# Patient Record
Sex: FEMALE | Race: WHITE | Employment: OTHER | URBAN - METROPOLITAN AREA
[De-identification: names, ages, dates, MRNs, and addresses within clinical notes are randomized per-mention and may not be internally consistent; named-entity substitution may affect disease eponyms.]

---

## 2017-01-09 RX ORDER — SIMVASTATIN 20 MG
TABLET ORAL
Qty: 30 TABLET | Refills: 0 | Status: SHIPPED | OUTPATIENT
Start: 2017-01-09 | End: 2017-02-09

## 2017-01-10 ENCOUNTER — HOSPITAL ENCOUNTER (OUTPATIENT)
Dept: MAMMOGRAPHY | Facility: HOSPITAL | Age: 67
Discharge: HOME | End: 2017-01-10
Attending: INTERNAL MEDICINE
Payer: MEDICARE

## 2017-01-10 DIAGNOSIS — Z12.31 SCREENING MAMMOGRAM, ENCOUNTER FOR: ICD-10-CM

## 2017-01-10 PROCEDURE — 77067 SCR MAMMO BI INCL CAD: CPT

## 2017-01-11 ENCOUNTER — OFFICE VISIT (OUTPATIENT)
Dept: PHYSICAL THERAPY | Facility: HOSPITAL | Age: 67
End: 2017-01-11
Attending: INTERNAL MEDICINE
Payer: MEDICARE

## 2017-01-11 DIAGNOSIS — T14.8XXA MUSCLE STRAIN: ICD-10-CM

## 2017-01-11 DIAGNOSIS — M54.2 NECK PAIN: ICD-10-CM

## 2017-01-11 DIAGNOSIS — M47.812 OSTEOARTHRITIS OF CERVICAL SPINE, UNSPECIFIED SPINAL OSTEOARTHRITIS COMPLICATION STATUS: Primary | ICD-10-CM

## 2017-01-11 PROCEDURE — 97161 PT EVAL LOW COMPLEX 20 MIN: CPT

## 2017-01-11 PROCEDURE — 97110 THERAPEUTIC EXERCISES: CPT

## 2017-01-11 NOTE — PATIENT INSTRUCTIONS
1. Sit up tall with good posture in a chair. Move your head gently up and down (chin towards chest then to look at ceiling). Do 10 times. A little discomfort but move slowly and only as far as you can without an increase in pain.      2. Sit up tall with go

## 2017-01-11 NOTE — PROGRESS NOTES
PT EVALUATION:   Referring Physician: Kris Lea MD    Date of Onset: 12/21/2016 Date of Service: 1/11/2017   Neck pain.      SUBJECTIVE:   PATIENT SUMMARY:  Vishal Plummer is a 78year old y/o female who presents to therapy today with complaints of Precautions: None      OBJECTIVE:   Observation/Posture: Forward head posture with hyperkyphosis. B shoulders rounded and protracted.      Cervical AROM:  Pain (+/-)   Flexion Reduced ~50% +R side only   Extension Reduced ~25% (-)   R Sidebend Reduced ~75 without limitation. Frequency/Duration: Patient will be seen for 2x/week for 5 weeks or a total of 10 visits over a 90 day period.  Treatment will include: Manual Therapy, Neuromuscular Re-education, Therapeutic Activities, Therapeutic Exercise, Home Ex

## 2017-01-13 ENCOUNTER — OFFICE VISIT (OUTPATIENT)
Dept: PHYSICAL THERAPY | Facility: HOSPITAL | Age: 67
End: 2017-01-13
Attending: INTERNAL MEDICINE
Payer: MEDICARE

## 2017-01-13 PROCEDURE — 97140 MANUAL THERAPY 1/> REGIONS: CPT

## 2017-01-13 PROCEDURE — 97110 THERAPEUTIC EXERCISES: CPT

## 2017-01-13 NOTE — PROGRESS NOTES
Diagnosis: Osteoarthritis of cervical spine, unspecified spinal osteoarthritis complication status (B05.182)  Neck pain (M54.2)  Authorized # of Visits:  2        Insurance:BCBS MEDICARE ADV IHP IPA   Fall Risk: standard         Precautions: n/a

## 2017-01-18 ENCOUNTER — APPOINTMENT (OUTPATIENT)
Dept: PHYSICAL THERAPY | Facility: HOSPITAL | Age: 67
End: 2017-01-18
Attending: INTERNAL MEDICINE
Payer: MEDICARE

## 2017-01-20 ENCOUNTER — OFFICE VISIT (OUTPATIENT)
Dept: PHYSICAL THERAPY | Facility: HOSPITAL | Age: 67
End: 2017-01-20
Attending: INTERNAL MEDICINE
Payer: MEDICARE

## 2017-01-20 PROCEDURE — 97140 MANUAL THERAPY 1/> REGIONS: CPT

## 2017-01-20 PROCEDURE — 97110 THERAPEUTIC EXERCISES: CPT

## 2017-01-20 NOTE — PROGRESS NOTES
Diagnosis: Osteoarthritis of cervical spine, unspecified spinal osteoarthritis complication status (Z11.114)  Neck pain (M54.2)  Authorized # of Visits:  3        Insurance:BCBS MEDICARE ADV IHP IPA   Fall Risk: standard         Precautions: n/a

## 2017-01-25 ENCOUNTER — OFFICE VISIT (OUTPATIENT)
Dept: PHYSICAL THERAPY | Facility: HOSPITAL | Age: 67
End: 2017-01-25
Attending: INTERNAL MEDICINE
Payer: MEDICARE

## 2017-01-25 PROCEDURE — 97110 THERAPEUTIC EXERCISES: CPT

## 2017-01-25 NOTE — PROGRESS NOTES
Diagnosis: Osteoarthritis of cervical spine, unspecified spinal osteoarthritis complication status (I64.288)  Neck pain (M54.2)  Authorized # of Visits: 4        Insurance:BCBS MEDICARE ADV IHP IPA   Fall Risk: standard         Precautions: n/a           M Treatment: 45 min  Total Treatment Time: 49 min

## 2017-01-27 ENCOUNTER — OFFICE VISIT (OUTPATIENT)
Dept: PHYSICAL THERAPY | Facility: HOSPITAL | Age: 67
End: 2017-01-27
Attending: INTERNAL MEDICINE
Payer: MEDICARE

## 2017-01-27 PROCEDURE — 97110 THERAPEUTIC EXERCISES: CPT

## 2017-01-27 NOTE — PROGRESS NOTES
Diagnosis: Osteoarthritis of cervical spine, unspecified spinal osteoarthritis complication status (V86.411)  Neck pain (M54.2)  Authorized # of Visits: 4        Insurance:BCBS MEDICARE ADV IHP IPA   Fall Risk: standard         Precautions: n/a           M

## 2017-01-30 RX ORDER — DONEPEZIL HYDROCHLORIDE 10 MG/1
TABLET, FILM COATED ORAL
Qty: 30 TABLET | Refills: 0 | Status: SHIPPED | OUTPATIENT
Start: 2017-01-30 | End: 2017-02-26

## 2017-02-01 ENCOUNTER — APPOINTMENT (OUTPATIENT)
Dept: PHYSICAL THERAPY | Facility: HOSPITAL | Age: 67
End: 2017-02-01
Attending: INTERNAL MEDICINE
Payer: MEDICARE

## 2017-02-01 PROCEDURE — 97140 MANUAL THERAPY 1/> REGIONS: CPT

## 2017-02-01 PROCEDURE — 97110 THERAPEUTIC EXERCISES: CPT

## 2017-02-01 NOTE — PROGRESS NOTES
Diagnosis: Osteoarthritis of cervical spine, unspecified spinal osteoarthritis complication status (W90.469)  Neck pain (M54.2)  Authorized # of Visits: 6        Insurance:BCBS MEDICARE ADV IHP IPA   Fall Risk: standard         Precautions: n/a           M

## 2017-02-03 ENCOUNTER — OFFICE VISIT (OUTPATIENT)
Dept: PHYSICAL THERAPY | Facility: HOSPITAL | Age: 67
End: 2017-02-03
Attending: INTERNAL MEDICINE
Payer: MEDICARE

## 2017-02-03 PROCEDURE — 97110 THERAPEUTIC EXERCISES: CPT

## 2017-02-03 PROCEDURE — 97140 MANUAL THERAPY 1/> REGIONS: CPT

## 2017-02-03 NOTE — PROGRESS NOTES
Discharge Report    Diagnosis: Osteoarthritis of cervical spine, unspecified spinal osteoarthritis complication status (Q54.215)  Neck pain (M54.2)          Patient Name: Hao Wade, : 1950, MRN: [id number]   Date:  2/3/2017  Radha Kumari Mobility: Walking and Moving Around, CJ: 20-39% impaired, limited, or restricted  DIscharge: Mobility: Walking and Moving Around, CJ: 20-39% impaired, limited, or restricted      Plan: discharged    Patient was advised of these findings, precautions, and t

## 2017-02-09 RX ORDER — SIMVASTATIN 20 MG
TABLET ORAL
Qty: 90 TABLET | Refills: 0 | Status: SHIPPED | OUTPATIENT
Start: 2017-02-09 | End: 2017-05-11

## 2017-02-10 ENCOUNTER — TELEPHONE (OUTPATIENT)
Dept: INTERNAL MEDICINE CLINIC | Facility: CLINIC | Age: 67
End: 2017-02-10

## 2017-02-10 NOTE — TELEPHONE ENCOUNTER
Patient POA calling states you are going to prescribe a medication if patient becomes more aggressive please send medication to pharmacy. Also patient finished PT on Friday how soon is patient to follow up in office.

## 2017-02-13 NOTE — TELEPHONE ENCOUNTER
Left message on voice mail for Mehrdad Sullivan to call office and schedule appt for patient to see Dr. Savana Dozierort the week of 2/20/17 phone number left to schedule appt.

## 2017-02-16 ENCOUNTER — TELEPHONE (OUTPATIENT)
Dept: INTERNAL MEDICINE CLINIC | Facility: CLINIC | Age: 67
End: 2017-02-16

## 2017-02-16 NOTE — TELEPHONE ENCOUNTER
Pt is eligible for a Medicare Annual Health Assessment. Pt's POA was to schedule f/u w/PCP. No appt on schedule. Left msg to call back.

## 2017-02-27 RX ORDER — DONEPEZIL HYDROCHLORIDE 10 MG/1
TABLET, FILM COATED ORAL
Qty: 30 TABLET | Refills: 0 | Status: SHIPPED | OUTPATIENT
Start: 2017-02-27 | End: 2017-03-28

## 2017-02-28 ENCOUNTER — TELEPHONE (OUTPATIENT)
Dept: INTERNAL MEDICINE CLINIC | Facility: CLINIC | Age: 67
End: 2017-02-28

## 2017-02-28 RX ORDER — LEVOTHYROXINE SODIUM 0.05 MG/1
TABLET ORAL
Qty: 90 TABLET | Refills: 0 | Status: SHIPPED | OUTPATIENT
Start: 2017-02-28 | End: 2017-06-30

## 2017-02-28 NOTE — TELEPHONE ENCOUNTER
Called and notified [employer] that letter is ready to be picked up. Per Midwayk[facility] Miller she will come in tomorrow to  lette. r

## 2017-02-28 NOTE — TELEPHONE ENCOUNTER
Patient Sarahstanford Taylor states patient is getting meals on wheels and they require letter stating her medical condition and also they need a list of medication's. Please call [place] Angela she will come to the [facility] office to .

## 2017-03-05 NOTE — MR AVS SNAPSHOT
[facility]  [address] [address]  [phone]               Thank you for choosing us for your health care visit with Gabo Gautam MD.  We are glad to serve you and happy to provide you with this summary of your vi physician's office. At that time, you will be provided with any authorization numbers or be assured that none are required. You can then schedule your appointment.  Failure to obtain required authorization numbers can create reimbursement difficulties for y [place], [address] [id number]     Phone:  [phone]    - simvastatin 20 MG Tabs            Health Goals discussed Today        Last Edited       Therapy Goals 1/11/2017  1:33 PM by Norberto Henry, PT     Goal Comments    1.  Pt will demonstrate given yes

## 2017-03-06 ENCOUNTER — OFFICE VISIT (OUTPATIENT)
Dept: INTERNAL MEDICINE CLINIC | Facility: CLINIC | Age: 67
End: 2017-03-06

## 2017-03-06 VITALS
TEMPERATURE: 98 F | HEIGHT: 61 IN | WEIGHT: 132.81 LBS | BODY MASS INDEX: 25.07 KG/M2 | HEART RATE: 76 BPM | SYSTOLIC BLOOD PRESSURE: 132 MMHG | DIASTOLIC BLOOD PRESSURE: 89 MMHG

## 2017-03-06 DIAGNOSIS — E78.5 HYPERLIPIDEMIA, UNSPECIFIED HYPERLIPIDEMIA TYPE: ICD-10-CM

## 2017-03-06 DIAGNOSIS — M54.2 CHRONIC NECK PAIN: Primary | ICD-10-CM

## 2017-03-06 DIAGNOSIS — F02.80 ALZHEIMER'S DEMENTIA WITHOUT BEHAVIORAL DISTURBANCE, UNSPECIFIED TIMING OF DEMENTIA ONSET (HCC): ICD-10-CM

## 2017-03-06 DIAGNOSIS — E03.9 HYPOTHYROIDISM, UNSPECIFIED TYPE: ICD-10-CM

## 2017-03-06 DIAGNOSIS — G89.29 CHRONIC NECK PAIN: Primary | ICD-10-CM

## 2017-03-06 DIAGNOSIS — M85.80 OSTEOPENIA, UNSPECIFIED LOCATION: ICD-10-CM

## 2017-03-06 DIAGNOSIS — R45.1 AGITATION: ICD-10-CM

## 2017-03-06 DIAGNOSIS — G30.9 ALZHEIMER'S DEMENTIA WITHOUT BEHAVIORAL DISTURBANCE, UNSPECIFIED TIMING OF DEMENTIA ONSET (HCC): ICD-10-CM

## 2017-03-06 LAB
APPEARANCE: CLEAR
BILIRUBIN: NEGATIVE
GLUCOSE (URINE DIPSTICK): NEGATIVE MG/DL
KETONES (URINE DIPSTICK): NEGATIVE MG/DL
LEUKOCYTES: NEGATIVE
MULTISTIX LOT#: NORMAL NUMERIC
NITRITE, URINE: NEGATIVE
PH, URINE: 5.5 (ref 4.5–8)
PROTEIN (URINE DIPSTICK): NEGATIVE MG/DL
SPECIFIC GRAVITY: 1.02 (ref 1–1.03)
URINE-COLOR: YELLOW
UROBILINOGEN,SEMI-QN: 0.2 MG/DL (ref 0–1.9)

## 2017-03-06 PROCEDURE — 99214 OFFICE O/P EST MOD 30 MIN: CPT | Performed by: INTERNAL MEDICINE

## 2017-03-06 PROCEDURE — 81002 URINALYSIS NONAUTO W/O SCOPE: CPT | Performed by: INTERNAL MEDICINE

## 2017-03-06 PROCEDURE — G0463 HOSPITAL OUTPT CLINIC VISIT: HCPCS | Performed by: INTERNAL MEDICINE

## 2017-03-07 ENCOUNTER — TELEPHONE (OUTPATIENT)
Dept: INTERNAL MEDICINE CLINIC | Facility: CLINIC | Age: 67
End: 2017-03-07

## 2017-03-07 NOTE — PROGRESS NOTES
HPI:    Patient ID: Amarjit Jama is a 78year old female. HPI  Pt here with friend.  Pt has been more agitated lately with things like mail and why her friend is checking her mail who is her POA to make sure the bills and everything else is paid and p Smoking Status: Never Smoker                      Smokeless Status: Never Used                        Alcohol Use: No                 PHYSICAL EXAM:    Physical Exam   Constitutional: She is oriented to person, place, and time.  She appears well-developed Referrals:  PHYSIATRY - INTERNAL  NEURO - INTERNAL  MRI SPINE CERVICAL (CPT=72141)        DX#[id number]

## 2017-03-07 NOTE — PATIENT INSTRUCTIONS
ASSESSMENT/PLAN:   Chronic neck pain  (primary encounter diagnosis)-?, will order an MRI and also refer to physiatrist,   Agitation- ?, could be due to advancing dementia follow with neuro  Alzheimer's dementia without behavioral disturbance, unspecif

## 2017-03-15 ENCOUNTER — TELEPHONE (OUTPATIENT)
Dept: NEUROLOGY | Facility: CLINIC | Age: 67
End: 2017-03-15

## 2017-03-15 NOTE — TELEPHONE ENCOUNTER
Shila Edwards has an upcoming appt with Dr Beth Kline on 3-28-17. She was given an order for a cervical MRI from Dr Carey Santamaria and Robert Rader, her POA, is asking if Dr Beth Kline would be ordering a brain MRI for her dementia.  If so, if he can give her an order now so she ca

## 2017-03-17 RX ORDER — MEMANTINE HYDROCHLORIDE 10 MG/1
TABLET ORAL
Qty: 180 TABLET | Refills: 0 | Status: SHIPPED | OUTPATIENT
Start: 2017-03-17 | End: 2017-06-16

## 2017-03-24 ENCOUNTER — MED REC SCAN ONLY (OUTPATIENT)
Dept: NEUROLOGY | Facility: CLINIC | Age: 67
End: 2017-03-24

## 2017-03-27 ENCOUNTER — HOSPITAL ENCOUNTER (OUTPATIENT)
Dept: MRI IMAGING | Facility: HOSPITAL | Age: 67
Discharge: HOME | End: 2017-03-27
Attending: INTERNAL MEDICINE
Payer: MEDICARE

## 2017-03-27 DIAGNOSIS — G89.29 CHRONIC NECK PAIN: ICD-10-CM

## 2017-03-27 DIAGNOSIS — M54.2 CHRONIC NECK PAIN: ICD-10-CM

## 2017-03-27 PROCEDURE — 72141 MRI NECK SPINE W/O DYE: CPT

## 2017-03-28 NOTE — PROGRESS NOTES
Garcia Natarajan : 1950     HPI:   Patient presents with:  Memory Loss: LOV: 12/9/15. F/U on memory loss. Patient presents today with friend Pepemaria m Jonas.  Patient feels her memory is doing okay and feels the aricept 10 mg daily and namenda 10 BID has helpe tablet Rfl: 3   LEVOTHYROXINE SODIUM 50 MCG Oral Tab TAKE 1 TABLET BY MOUTH BEFORE BREAKFAST Disp: 90 tablet Rfl: 0   SIMVASTATIN 20 MG Oral Tab TAKE 1 TABLET BY MOUTH NIGHTLY Disp: 90 tablet Rfl: 0   Cyclobenzaprine HCl 5 MG Oral Tab Take 1 tablet (5 mg t appearance: In no distress  CV: No  Evidence of Carotid Bruits, Regular Rate and Rhythm. Neuro:  Higher Integrative Functions:  Alert and cooperative, with normal attention span and concentration.   Speech and language normal.   She knew her birthday, but

## 2017-05-01 ENCOUNTER — OFFICE VISIT (OUTPATIENT)
Dept: INTERNAL MEDICINE CLINIC | Facility: CLINIC | Age: 67
End: 2017-05-01

## 2017-05-01 VITALS
WEIGHT: 132 LBS | HEART RATE: 69 BPM | BODY MASS INDEX: 24.92 KG/M2 | DIASTOLIC BLOOD PRESSURE: 85 MMHG | TEMPERATURE: 98 F | SYSTOLIC BLOOD PRESSURE: 130 MMHG | HEIGHT: 61 IN

## 2017-05-01 DIAGNOSIS — F03.90 ADVANCED DEMENTIA (HCC): ICD-10-CM

## 2017-05-01 DIAGNOSIS — R39.9 URINARY SYMPTOM OR SIGN: Primary | ICD-10-CM

## 2017-05-01 DIAGNOSIS — F41.9 ANXIETY: ICD-10-CM

## 2017-05-01 PROCEDURE — 81002 URINALYSIS NONAUTO W/O SCOPE: CPT | Performed by: INTERNAL MEDICINE

## 2017-05-01 PROCEDURE — 99213 OFFICE O/P EST LOW 20 MIN: CPT | Performed by: INTERNAL MEDICINE

## 2017-05-01 PROCEDURE — G0463 HOSPITAL OUTPT CLINIC VISIT: HCPCS | Performed by: INTERNAL MEDICINE

## 2017-05-01 RX ORDER — SIMVASTATIN 20 MG
TABLET ORAL
Qty: 90 TABLET | Refills: 1 | Status: ON HOLD | OUTPATIENT
Start: 2017-05-01 | End: 2020-10-20

## 2017-05-02 NOTE — PROGRESS NOTES
HPI:    Patient ID: Maria Antonia Avila is a 78year old female. HPI  Pt comes with friend.  Pt has been more anxious recently since her back sold her mortgage to another bank in [place] so pt  Has been going to the bank every day asking questions about this a • Other[other] [OTHER] Mother      alzheimers      Social History:   Smoking Status: Never Smoker                      Smokeless Status: Never Used                        Alcohol Use: No                 PHYSICAL EXAM:    Physical Exam   Constitutional: S

## 2017-05-11 RX ORDER — SIMVASTATIN 20 MG
TABLET ORAL
Qty: 90 TABLET | Refills: 0 | Status: SHIPPED | OUTPATIENT
Start: 2017-05-11 | End: 2018-02-03

## 2017-06-16 NOTE — TELEPHONE ENCOUNTER
Dr. Sandrita Garces, rx request for Memantine HCL 10 mg, UNABLE to fill per protocol. Please Review. Thank you.     Refill Protocol Appointment Criteria  · Appointment scheduled in the past 6 months or in the next 3 months  Recent Visits       Provider Departme

## 2017-06-17 RX ORDER — MEMANTINE HYDROCHLORIDE 10 MG/1
TABLET ORAL
Qty: 180 TABLET | Refills: 0 | Status: ON HOLD | OUTPATIENT
Start: 2017-06-17 | End: 2020-10-20

## 2017-06-18 ENCOUNTER — HOSPITAL ENCOUNTER (EMERGENCY)
Facility: HOSPITAL | Age: 67
Discharge: HOME | End: 2017-06-18
Attending: EMERGENCY MEDICINE
Payer: MEDICARE

## 2017-06-18 VITALS
HEIGHT: 60 IN | BODY MASS INDEX: 29.45 KG/M2 | DIASTOLIC BLOOD PRESSURE: 88 MMHG | OXYGEN SATURATION: 99 % | WEIGHT: 150 LBS | TEMPERATURE: 98 F | SYSTOLIC BLOOD PRESSURE: 154 MMHG | RESPIRATION RATE: 18 BRPM | HEART RATE: 72 BPM

## 2017-06-18 DIAGNOSIS — S11.91XA LACERATION OF NECK, INITIAL ENCOUNTER: ICD-10-CM

## 2017-06-18 DIAGNOSIS — S01.81XA FACIAL LACERATION, INITIAL ENCOUNTER: Primary | ICD-10-CM

## 2017-06-18 PROCEDURE — 90471 IMMUNIZATION ADMIN: CPT

## 2017-06-18 PROCEDURE — 99283 EMERGENCY DEPT VISIT LOW MDM: CPT

## 2017-06-18 RX ORDER — CEPHALEXIN 500 MG/1
500 CAPSULE ORAL 2 TIMES DAILY
Qty: 10 CAPSULE | Refills: 0 | Status: SHIPPED | OUTPATIENT
Start: 2017-06-18 | End: 2017-06-23

## 2017-06-19 NOTE — ED PROVIDER NOTES
Patient presents with:  Laceration Abrasion (integumentary)    Stated Complaint:     HPI  Patient complains being scratched by her cat tonight. Just below the left eye and on the right side of the neck. Describes as superficial minimal bleeding.   No eye visit.     Follow-up:  Pam Phillip MD  [facility] [address] [address] [phone]            Medications Prescribed:  Current Discharge Medication List    START taking these medications    cephALEXin 500 MG Oral Cap  Take 1 capsule (500 mg tot

## 2017-06-23 ENCOUNTER — TELEPHONE (OUTPATIENT)
Dept: INTERNAL MEDICINE CLINIC | Facility: CLINIC | Age: 67
End: 2017-06-23

## 2017-06-23 NOTE — TELEPHONE ENCOUNTER
Pt is eligible for a Medicare Annual Health Assessment anytime during the calendar year. Left message to call back [phone].

## 2017-06-30 RX ORDER — LEVOTHYROXINE SODIUM 0.05 MG/1
TABLET ORAL
Qty: 90 TABLET | Refills: 0 | Status: SHIPPED | OUTPATIENT
Start: 2017-06-30 | End: 2017-09-26

## 2017-08-15 ENCOUNTER — TELEPHONE (OUTPATIENT)
Dept: INTERNAL MEDICINE CLINIC | Facility: CLINIC | Age: 67
End: 2017-08-15

## 2017-09-26 RX ORDER — LEVOTHYROXINE SODIUM 0.05 MG/1
TABLET ORAL
Qty: 90 TABLET | Refills: 0 | Status: SHIPPED | OUTPATIENT
Start: 2017-09-26 | End: 2017-12-24

## 2017-10-16 ENCOUNTER — IMMUNIZATION (OUTPATIENT)
Dept: INTERNAL MEDICINE CLINIC | Facility: CLINIC | Age: 67
End: 2017-10-16

## 2017-10-16 PROCEDURE — 90653 IIV ADJUVANT VACCINE IM: CPT | Performed by: INTERNAL MEDICINE

## 2017-10-16 PROCEDURE — G0008 ADMIN INFLUENZA VIRUS VAC: HCPCS | Performed by: INTERNAL MEDICINE

## 2017-11-29 ENCOUNTER — TELEPHONE (OUTPATIENT)
Dept: INTERNAL MEDICINE CLINIC | Facility: CLINIC | Age: 67
End: 2017-11-29

## 2017-11-29 DIAGNOSIS — L60.9 NAIL PROBLEM: Primary | ICD-10-CM

## 2017-11-29 NOTE — TELEPHONE ENCOUNTER
Patients daughter Theodora Torres called, asking if you would refer patient to a podiatrist or if you would be handeling her foot care

## 2017-11-29 NOTE — TELEPHONE ENCOUNTER
Per Juan A Ojeda, need podiatry referral for long toe nails. Referral pending. Please add diagnosis.

## 2017-12-24 NOTE — TELEPHONE ENCOUNTER
Please advise regarding pended refill request as unable to refill per protocol; no TSH in over a year.     Hypothyroid Medications  Protocol Criteria:  Appointment scheduled in the past 12 months or the next 3 months  TSH resulted in the past 12 months that

## 2017-12-25 RX ORDER — LEVOTHYROXINE SODIUM 0.05 MG/1
TABLET ORAL
Qty: 90 TABLET | Refills: 0 | Status: SHIPPED | OUTPATIENT
Start: 2017-12-25 | End: 2018-03-26

## 2018-01-08 ENCOUNTER — TELEPHONE (OUTPATIENT)
Dept: INTERNAL MEDICINE CLINIC | Facility: CLINIC | Age: 68
End: 2018-01-08

## 2018-01-08 DIAGNOSIS — Z12.31 BREAST CANCER SCREENING BY MAMMOGRAM: Primary | ICD-10-CM

## 2018-01-08 NOTE — TELEPHONE ENCOUNTER
Requesting a Mammo order please call pt friend Wilman Lee at work from 8:30 to 4:30 Work [phone]   After 5 at her cell

## 2018-01-09 ENCOUNTER — OFFICE VISIT (OUTPATIENT)
Dept: PODIATRY CLINIC | Facility: CLINIC | Age: 68
End: 2018-01-09

## 2018-01-09 DIAGNOSIS — M79.674 PAIN IN TOES OF BOTH FEET: Primary | ICD-10-CM

## 2018-01-09 DIAGNOSIS — B35.1 ONYCHOMYCOSIS: ICD-10-CM

## 2018-01-09 DIAGNOSIS — M79.675 PAIN IN TOES OF BOTH FEET: Primary | ICD-10-CM

## 2018-01-09 PROCEDURE — 99202 OFFICE O/P NEW SF 15 MIN: CPT | Performed by: PODIATRIST

## 2018-01-09 PROCEDURE — 11721 DEBRIDE NAIL 6 OR MORE: CPT | Performed by: PODIATRIST

## 2018-01-09 NOTE — PROGRESS NOTES
HPI:    Patient ID: Hao Wade is a 75year old female. HPI  This pleasant 79-year-old female presents as a new patient to me on referral from [address] [place] [address]. Patient is accompanied by a friend who did most of her talking.   Patient's chief complaint is toenails was accomplished today without incident. I reduced nail, subungual debris, and some keratosis. No ulcerations or infections were encountered.   I cautioned this patient in reference to daily inspection, proper hygiene, and the consistent daily us

## 2018-02-03 ENCOUNTER — HOSPITAL ENCOUNTER (OUTPATIENT)
Dept: MAMMOGRAPHY | Facility: HOSPITAL | Age: 68
Discharge: HOME | End: 2018-02-03
Attending: INTERNAL MEDICINE
Payer: MEDICARE

## 2018-02-03 DIAGNOSIS — Z12.31 BREAST CANCER SCREENING BY MAMMOGRAM: ICD-10-CM

## 2018-02-03 PROCEDURE — 77067 SCR MAMMO BI INCL CAD: CPT | Performed by: INTERNAL MEDICINE

## 2018-02-04 NOTE — TELEPHONE ENCOUNTER
Please advise regarding pended refill request as unable to refill per protocol since labs are out of date range.     Cholesterol Medications  Protocol Criteria:  · Appointment scheduled in the past 12 months or in the next 3 months  · ALT & LDL on file in t

## 2018-02-05 RX ORDER — SIMVASTATIN 20 MG
TABLET ORAL
Qty: 90 TABLET | Refills: 0 | Status: SHIPPED | OUTPATIENT
Start: 2018-02-05 | End: 2018-04-02

## 2018-03-08 ENCOUNTER — PATIENT OUTREACH (OUTPATIENT)
Dept: INTERNAL MEDICINE CLINIC | Facility: CLINIC | Age: 68
End: 2018-03-08

## 2018-03-08 NOTE — PROGRESS NOTES
Patient is eligible for a 2018 Annual Medicare Health Assessment. Discussed in detail w/caregiver, Liu Conrad. Appt scheduled for 4/17/18.

## 2018-03-23 ENCOUNTER — TELEPHONE (OUTPATIENT)
Dept: INTERNAL MEDICINE CLINIC | Facility: CLINIC | Age: 68
End: 2018-03-23

## 2018-03-23 RX ORDER — DONEPEZIL HYDROCHLORIDE 10 MG/1
TABLET, FILM COATED ORAL
Qty: 90 TABLET | Refills: 0 | OUTPATIENT
Start: 2018-03-23

## 2018-03-23 NOTE — TELEPHONE ENCOUNTER
She can see any of the other physicians in his office.  This should be the standard response to this question which may come up a lot

## 2018-03-25 RX ORDER — LEVOTHYROXINE SODIUM 0.05 MG/1
TABLET ORAL
Qty: 90 TABLET | Refills: 0 | Status: CANCELLED | OUTPATIENT
Start: 2018-03-25

## 2018-03-25 NOTE — TELEPHONE ENCOUNTER
Please advise regarding pended refill request as unable to refill per protocol d/t no recent TSH.     Hypothyroid Medications  Protocol Criteria:  Appointment scheduled in the past 12 months or the next 3 months  TSH resulted in the past 12 months that is normal  Recent Outpatient Visits            2 months ago Pain in toes of both feet    [facility], [address] [place], [place]    Office Visit    10 months ago Urinary symptom or sign    [facility], [facility], [address] [facility], [place]    Office Visit    12 months ago Alzheimer's dementia without behavioral disturbance, unspecified timing of dementia onset    [facility], [address], [address], [place]    Office Visit    1 year ago Chronic neck pain    [facility], [facility], [address] [place] [address], Vidya Vu MD    Office Visit    1 year ago     [facility] [address]    Office Visit        Future Appointments       Provider Department Appt Notes    In 2 weeks Mil Torres, [address] [place] [address], [address]  3 mo Nail care    In 3 weeks Rosie Mosley MD [address] [facility]           Lab Results  Component Value Date   TSH 4.39 10/12/2016   [facility] 3.46 04/04/2016

## 2018-03-26 ENCOUNTER — TELEPHONE (OUTPATIENT)
Dept: INTERNAL MEDICINE CLINIC | Facility: CLINIC | Age: 68
End: 2018-03-26

## 2018-03-26 RX ORDER — LEVOTHYROXINE SODIUM 0.05 MG/1
TABLET ORAL
Qty: 30 TABLET | Refills: 0 | Status: SHIPPED | OUTPATIENT
Start: 2018-03-26 | End: 2018-04-25

## 2018-03-26 NOTE — TELEPHONE ENCOUNTER
Patient needs labs last TSH 10/12/16 has appt 4/17/18  Refill only quantity of 30 tablets til patient has labs drawn.

## 2018-04-02 PROBLEM — E53.8 LOW SERUM VITAMIN B12: Status: ACTIVE | Noted: 2018-04-02

## 2018-04-03 NOTE — PROGRESS NOTES
HPI:    Patient ID: Zach Krishna is a 75year old female. HPI  Patient comes in today for follow-up also present before his friends let us and Rik Dickson good friends of patient who take care of her.   They have noticed the patient lately has been coughing Problem Relation Age of Onset   • Cancer Father      bone   • Other [OTHER] Mother      alzheimers   • No Known Problems Self    • No Known Problems Sister    • No Known Problems Daughter    • No Known Problems Maternal Grandmother    • No Known Problems will check  Acute bronchitis, unspecified organism will treat with antibiotic and cough medicine let us know if not better      Orders Placed This Encounter      CBC W Differential W Platelet [E] Specimen      Comp Metabolic Panel (14) [E] Specimen      Yodit Perez

## 2018-04-03 NOTE — PATIENT INSTRUCTIONS
ASSESSMENT/PLAN:   Alzheimer's dementia without behavioral disturbance, unspecified timing of dementia onset  (primary encounter diagnosis)'s probably progressing will refer to neurology Dr. Rony Cazares is retiring so we will give referral to Dr. Gia Kaminski

## 2018-04-09 ENCOUNTER — OFFICE VISIT (OUTPATIENT)
Dept: PODIATRY CLINIC | Facility: CLINIC | Age: 68
End: 2018-04-09

## 2018-04-09 DIAGNOSIS — M79.675 PAIN IN TOES OF BOTH FEET: Primary | ICD-10-CM

## 2018-04-09 DIAGNOSIS — M79.674 PAIN IN TOES OF BOTH FEET: Primary | ICD-10-CM

## 2018-04-09 DIAGNOSIS — B35.1 ONYCHOMYCOSIS: ICD-10-CM

## 2018-04-09 PROCEDURE — 11721 DEBRIDE NAIL 6 OR MORE: CPT | Performed by: PODIATRIST

## 2018-04-09 NOTE — PROGRESS NOTES
HPI:    Patient ID: Leobardo Rodriguez is a 75year old female. HPI  70-year-old female presents with recurrent frustration associated with her nails. She has had relief by previous care.   Review of Systems  I reviewed medical status, medications were not

## 2018-04-25 RX ORDER — LEVOTHYROXINE SODIUM 0.05 MG/1
TABLET ORAL
Qty: 30 TABLET | Refills: 2 | Status: ON HOLD | OUTPATIENT
Start: 2018-04-25 | End: 2020-10-20

## 2018-04-25 NOTE — TELEPHONE ENCOUNTER
Rx request for Levothyroxine Sodium 50 mcg, PASSED Hypothyroid protocol. Rx filled per protocol.     Hypothyroid Medications  Protocol Criteria:  Appointment scheduled in the past 12 months or the next 3 months  TSH resulted in the past 12 months that is no

## 2018-05-02 NOTE — PATIENT INSTRUCTIONS
Caregiver Resources:    [facility] ([facility] [place]):     of Senior Services: Samira Geronimo   Phone:   [phone] or  [phone]  Fax:   [phone]  TDD:   [phone]  Office Hours:   8 a.m. - 4:30 p.m.   Monday - they may ask the same question over and over. Although it can be frustrating, do your best to remain calm. Try changing the subject or getting your loved one to focus on a new task. Or, try to understand why the question is being asked.  For example, your l suspicious, angry, or afraid. They can also become agitated, or see things that aren’t there. If this happens, try to be understanding. For them, the world can be a very stressful place.  However, if these changes are sudden, severe, or create safety issues they see your loved one out alone. · Go with your loved one if he or she insists on leaving the house. Don’t argue or yell. Instead, use distraction or gentle hints to get him or her to return home.   · People with dementia often have a reversed sleep-wake locks you out. Prevent fraud  People with dementia may be easy prey for dishonest salespeople or money scams. Try placing a “No Solicitations” sign on your loved one’s front door.  Add his or her phone number to the [place] [employer]’s “Do Not Call” can make decisions in your loved one’s best interest.  Advance directives and living conn  These documents spell out the kinds of medical treatment your loved one wants — or doesn’t want — in the future. Keep them with your loved one’s medical records.   L it’s normal to want to do as much as you can to help. But you can’t take good care of someone else if you don’t take care of yourself, too. So be sure to take breaks when you need them. It’s not selfish. It’s essential. Get out to see friends. Eat right.  A resentful. This isn’t a sign you’re doing something wrong. It’s completely normal. So accept these emotions as they come. However, if you find yourself feeling hopeless, tired, sad, or guilty most of the time, talk to your healthcare provider.  These feelin This will affect how the rest of your loved one’s body functions.  With end-stage dementia, your loved one may no longer:  · Recognize family members and friends  · Reason or have sound judgment  · Speak or understand language  · Have bowel and bladder cont difficult, remember that the goal is to provide the best care and quality of life for your loved one. Think about conversations you may have shared about the kind of treatments your loved one wants at the end of life.  Consider their personal values or fait support group for families and caregivers of loved ones with dementia may also help. You can seek support from your loved one’s healthcare team as well. You can also contact your [facility] to find other resources within your community.    Alpesh

## 2018-05-04 ENCOUNTER — TELEPHONE (OUTPATIENT)
Dept: INTERNAL MEDICINE CLINIC | Facility: CLINIC | Age: 68
End: 2018-05-04

## 2018-05-04 PROBLEM — F03.90 DEMENTIA WITHOUT BEHAVIORAL DISTURBANCE, UNSPECIFIED DEMENTIA TYPE (HCC): Status: ACTIVE | Noted: 2018-05-04

## 2018-05-04 PROBLEM — F03.90 DEMENTIA WITHOUT BEHAVIORAL DISTURBANCE, UNSPECIFIED DEMENTIA TYPE: Status: ACTIVE | Noted: 2018-05-04

## 2018-05-04 PROBLEM — F03.90 DEMENTIA WITHOUT BEHAVIORAL DISTURBANCE (HCC): Status: ACTIVE | Noted: 2018-05-04

## 2018-05-04 RX ORDER — SIMVASTATIN 20 MG
TABLET ORAL
Qty: 90 TABLET | Refills: 0 | Status: SHIPPED | OUTPATIENT
Start: 2018-05-04 | End: 2018-06-05

## 2018-05-04 NOTE — TELEPHONE ENCOUNTER
Patients power of  Sherrill Holstein called [phone], she is looking into nursing home placement for patient due to the dementia she has. Sherrill Holstein will be coming into the  office to  the patients last office visit with you.   Sherrill Holstein is asking fo

## 2018-05-04 NOTE — TELEPHONE ENCOUNTER
Pt's POA would like to speak to Dr BURCH regarding recommendation for a stay home for [facility].

## 2018-05-04 NOTE — TELEPHONE ENCOUNTER
Rx request for Simvastatin 20 mg, PASSED Cholesterol Protocol. Rx filled per protocol.      Cholesterol Medications  Protocol Criteria:  · Appointment scheduled in the past 12 months or in the next 3 months  · ALT & LDL on file in the past 12 months  · ALT

## 2018-05-04 NOTE — TELEPHONE ENCOUNTER
o spoke to Ronni Jaffe, pt is a danger to self she wonders off walking around on the neighborhood and streets, will take her to ER

## 2018-05-04 NOTE — TELEPHONE ENCOUNTER
Dr. Mily Fisher please see message below. pts POA requesting to speak directly with you. States pt is becoming more easily agitated and is wondering more often. [phone].

## 2018-05-05 PROBLEM — R29.6 FREQUENT FALLS: Status: ACTIVE | Noted: 2018-05-05

## 2018-05-05 PROBLEM — R26.2 DIFFICULTY WALKING: Status: ACTIVE | Noted: 2018-05-05

## 2018-05-05 NOTE — PLAN OF CARE
Problem: Patient Centered Care  Goal: Patient preferences are identified and integrated in the patient's plan of care  Interventions:  - What would you like us to know as we care for you?  My friends help take care of me   - Provide timely, complete, and ac Outcome: Not Progressing  Pt very confused;  Unable to understand teaching or POC    Problem: SAFETY ADULT - FALL  Goal: Free from fall injury  INTERVENTIONS:  - Assess pt frequently for physical needs  - Identify cognitive and physical deficits and behav

## 2018-05-05 NOTE — CM/SW NOTE
Met with friend who states is POA and requesting NH listing, provided with Preferred Provider listing, MD notified of above.

## 2018-05-05 NOTE — ED PROVIDER NOTES
Patient Seen in: [facility] AND [facility] Emergency Department     History   Patient presents with:  Altered Mental Status (neurologic)    Stated Complaint: ams    HPI    75year old female with history of dementia who lives at home alone, brought to ER by friend • No Known Problems Paternal Cousin Male    • No Known Problems Other    • Breast Cancer Neg    • Ovarian Cancer Neg    • DCIS Neg    • LCIS Neg    • BRCA gene + Neg    • Ashkenazi Mu-ism Descent Neg        Smoking status: Never Smoker motion. She exhibits no edema. Neurological: She is alert. No cranial nerve deficit. She exhibits normal muscle tone. She displays no seizure activity. Coordination normal. GCS eye subscore is 4. GCS verbal subscore is 4. GCS motor subscore is 6.    Skin: Temp: 98.8 °F (37.1 °C)    SpO2: 97% 97%   Weight: 58.2 kg      *I personally reviewed and interpreted all ED vitals.     MDM     Emergency Differential Diagnosis: Progressive dementia, less likely delirium    Limitations of history:   unable to obtain hi workup and a number of associated acute management issues with the patient, and I explained the need for further follow-up evaluation and treatment. Risk:  Patient is at High risk of significant complication or comorbidity.         Disposition and Plan

## 2018-05-05 NOTE — SPIRITUAL CARE NOTE
SD    Pt. Has Dementia and cannot communicate much at all, other than to keep saying that her 'dad '.      Chp. SD

## 2018-05-05 NOTE — CM/SW NOTE
5/5/18  Discharge Planning   Spoke with friend Claudean Sell via phone [phone]. Pt resides alone, tri level home, POA reports pt was independent with ADL's and    ambulation, recently became more confused and started wandering.   POA in agreement with sarah

## 2018-05-05 NOTE — CONSULTS
[facility]    Report of Consultation    Memory Bear Patient Status:  Inpatient    1950 MRN [id number]   Location [facility] 5SW/SE Attending Yuly Ha MD   Hosp Day # 1 PCP Taras Chávez MD     Date of Admission: station our nurses but she could not process that this is a hospital.  Patient exhibited significant impairment cognitive function impairing her executive function and her ability to care for self.     Patient was not able to process Mini-Mental Status neit tab 25 mg 25 mg Oral Nightly PRN   Levothyroxine Sodium (SYNTHROID) tab 50 mcg 50 mcg Oral Before breakfast   Memantine HCl (NAMENDA) tab 10 mg 10 mg Oral BID   Vitamin B-12 (VITAMIN B12) tab 1,000 mcg 1,000 mcg Oral Daily   Vitamin C (VITAMIN C) tab 500 m sitting next to the nurse and the nurses station. Patient according to the nurse was wondering in the hallway and she responded to some distraction.   Patient otherwise exhibited inability to sustain attention with her eyes moving around and her thought th

## 2018-05-05 NOTE — H&P
[facility]    History and Physical    [facility] Patient Status:  Inpatient    1950 MRN [id number]   Location [facility] 5SW/SE Attending Dustin Crowder MD   Hosp Day # 1 PCP Bruno Vo MD     Date:  2018  Date of Prior to Admission:  SIMVASTATIN 20 MG Oral Tab TAKE 1 TABLET BY MOUTH NIGHTLY   Sertraline HCl 50 MG Oral Tab Take 1 tablet (50 mg total) by mouth daily.    QUEtiapine Fumarate 25 MG Oral Tab 1/2 to 1 tablet as needed for agitation   LEVOTHYROXINE SODIUM 5 05/05/2018    05/05/2018   CO2 24 05/05/2018    (H) 05/05/2018   CA 8.8 05/05/2018   ALB 3.8 04/02/2018   ALKPHO 56 04/02/2018   BILT 0.4 04/02/2018   TP 6.1 04/02/2018   AST 20 04/02/2018   ALT 15 04/02/2018   T4F 0.95 12/21/2015   TSH 4.65 0

## 2018-05-06 NOTE — PLAN OF CARE
Problem: Patient Centered Care  Goal: Patient preferences are identified and integrated in the patient's plan of care  Interventions:  - What would you like us to know as we care for you?  My friends help take care of me   - Provide timely, complete, and ac assessment.  - Educate pt/family on patient safety including physical limitations  - Instruct pt to call for assistance with activity based on assessment  - Modify environment to reduce risk of injury  - Provide assistive devices as appropriate  - Consider

## 2018-05-06 NOTE — PLAN OF CARE
Problem: Patient Centered Care  Goal: Patient preferences are identified and integrated in the patient's plan of care  Interventions:  - What would you like us to know as we care for you?  My friends help take care of me   - Provide timely, complete, and ac from fall injury  INTERVENTIONS:  - Assess pt frequently for physical needs  - Identify cognitive and physical deficits and behaviors that affect risk of falls.   - [place] fall precautions as indicated by assessment.  - Educate pt/family on patient safet

## 2018-05-06 NOTE — PROGRESS NOTES
[place] [facility]    Progress Note    Marlene Moon Patient Status:  Inpatient    1950 MRN [id number]   Location [facility] 5SW/SE Attending Marvell Soulier, MD   Hosp Day # 2 PCP Alexandr Anguiano MD     Subjective:     Psychiatric/B 04/02/2018   TP 6.1 04/02/2018   AST 20 04/02/2018   ALT 15 04/02/2018   T4F 0.95 12/21/2015   TSH 4.65 04/02/2018   B12 >1,500 (H) 04/02/2018                         Author MD Shaka  5/6/2018

## 2018-05-06 NOTE — PROGRESS NOTES
The patient seen today for 35 minute over 50% counseling and managing treatment plan. The patient was wandering in her room and the hallway when she was able to get redirected and came to her room.   Patient continue being confused expressing difficulty na

## 2018-05-06 NOTE — OCCUPATIONAL THERAPY NOTE
OCCUPATIONAL THERAPY EVALUATION - INPATIENT     Room Number: 567/567-A  Evaluation Date: 5/6/2018  Type of Evaluation: Initial  Presenting Problem: ams;frequent falls    Physician Order: IP Consult to Occupational Therapy  Reason for Therapy: ADL/IADL Dy Frontal lobe dementia    Episodic mood disorder [facility])      Past Medical History  Past Medical History:   Diagnosis Date   • Alzheimer disease    • Anxiety state, unspecified        Past Surgical History  Past Surgical History:  No date: CARPAL TUNNEL RELEA supervision  Shower Transfer: na  Chair Transfer: supervision    Bedroom Mobility: supervision w/o ad    BALANCE ASSESSMENT  Static Sitting: independent  Dynamic Sitting: independent  Static Standing: supervision  Dynamic Standing: supervision    [place]

## 2018-05-07 NOTE — CM/SW NOTE
Per Nafisa Bullock liaison, pt will require on-site due to dementia. Liaison to do on-site today. SW notified [facility] pt is able to d/c today.     KRISTAN left  for pt's insurance in regards to SNF referral and approval.    12:00PM: [facility] able to accept - will send out for

## 2018-05-07 NOTE — PROGRESS NOTES
[facility]    Progress Note    Lukasz Carson Patient Status:  Inpatient    1950 MRN [id number]   Location [facility] 5SW/SE Attending Ryan Gramajo MD   Hosp Day # 3 PCP Bhargav Solorzano MD     Subjective:     Psychiatric/B 56 04/02/2018   BILT 0.4 04/02/2018   TP 6.1 04/02/2018   AST 20 04/02/2018   ALT 15 04/02/2018   T4F 0.95 12/21/2015   TSH 4.65 04/02/2018   B12 >1,500 (H) 04/02/2018                         Radha Albarado MD  5/7/2018

## 2018-05-08 ENCOUNTER — TELEPHONE (OUTPATIENT)
Dept: INTERNAL MEDICINE CLINIC | Facility: CLINIC | Age: 68
End: 2018-05-08

## 2018-05-08 NOTE — CM/SW NOTE
Sw contacted pt's friend/HPOA/Gita to discuss that insurance will not approve SNF stay. SW discussed privately paying at a facility, hiring 24hr caregivers, or having people (neighbors/[employer]/friends) to assist w/ care at home for pt.  Marita Kuhn stated that she

## 2018-05-08 NOTE — PLAN OF CARE
Problem: Patient Centered Care  Goal: Patient preferences are identified and integrated in the patient's plan of care  Interventions:  - What would you like us to know as we care for you?  My friends help take care of me   - Provide timely, complete, and ac from fall injury  INTERVENTIONS:  - Assess pt frequently for physical needs  - Identify cognitive and physical deficits and behaviors that affect risk of falls.   - [place] fall precautions as indicated by assessment.  - Educate pt/family on patient safet

## 2018-05-08 NOTE — TELEPHONE ENCOUNTER
Patients CARINA Miranda called, the hospital  just called her and told her that her insurance does not cover a rehab facility, her recommendation is to send her home for 24 hour nursing care, and that she is being discharged today or tomorow. Patient does not have the money to cover this and she cannot take care of her.   Concepción Miranda states that the  made no effort in helping her finding a placement for patient, please advise

## 2018-05-09 NOTE — CM/SW NOTE
KRISTAN followed up w/ JO/Gita in regards to d/c plans. -KRISTAN discussed paying respite rate of 200 dollars at [place] [address] stated there are no funds for this or hiring private pay caregiver.  -KRISTAN discussed starting Medicaid application w/ Elieser Rios.  KRISTAN discu

## 2018-05-09 NOTE — PROGRESS NOTES
[facility]    Progress Note    Pineda Jiménez Patient Status:  Inpatient    1950 MRN [id number]   Location [facility] 5SW/SE Attending Álvaro Jean-Baptiste MD   Hosp Day # 5 PCP Jacinda Wan MD     Subjective:     Psychiatric/B 05/05/2018    05/05/2018   CREATSERUM 0.87 05/05/2018   BUN 17 05/05/2018    05/05/2018   K 3.8 05/05/2018    05/05/2018   CO2 24 05/05/2018    (H) 05/05/2018   CA 8.8 05/05/2018   ALB 3.8 04/02/2018   ALKPHO 56 04/02/2018   BILT

## 2018-05-10 NOTE — PROGRESS NOTES
[facility]    [facility] Note    Shyanne Nweman Patient Status:  Inpatient    1950 MRN [id number]   Location [facility] 5SW/SE Attending Trinidad Pool MD   Hosp Day # 6 PCP Rachel Mcpherson MD     Subjective:     Psychiatric/B    Results:     Lab Results  Component Value Date   WBC 6.2 05/05/2018   HGB 15.0 05/05/2018   HCT 44.1 05/05/2018    05/05/2018   CREATSERUM 0.87 05/05/2018   BUN 17 05/05/2018    05/05/2018   K 3.8 05/05/2018    05/05/2018   CO2 24 0

## 2018-05-10 NOTE — CM/SW NOTE
SW left  for HPOA/Gita to follow up on discharge plans/Medicaid application.     Domenica Duenas, [address]

## 2018-05-10 NOTE — PLAN OF CARE
Problem: Patient Centered Care  Goal: Patient preferences are identified and integrated in the patient's plan of care  Interventions:  - What would you like us to know as we care for you?  My friends help take care of me   - Provide timely, complete, and ac risk of falls.   - [place] fall precautions as indicated by assessment.  - Educate pt/family on patient safety including physical limitations  - Instruct pt to call for assistance with activity based on assessment  - Modify environment to reduce risk of i

## 2018-05-10 NOTE — OCCUPATIONAL THERAPY NOTE
OCCUPATIONAL THERAPY ReEVALUATION - INPATIENT     Room Number: 567/567-A  Evaluation Date: 5/10/2018  Type of Evaluation: Re-evaluation  Presenting Problem: ams;frequent falls    Physician Order: IP Consult to Occupational Therapy  Reason for Therapy: ADL/ (memory care facility)  OT Device Recommendations: None    PLAN  OT Treatment Plan:  (discharge from OT)       OCCUPATIONAL THERAPY MEDICAL/SOCIAL HISTORY     Problem List  Active Problems:    Agitation    Hyperlipidemia    Dementia without behavioral dist within functional limits     COORDINATION  Gross Motor: WFL   Fine Motor: WFL     ACTIVITIES OF DAILY LIVING ASSESSMENT  AM-PAC ‘6-Clicks’ Inpatient Daily Activity Short Form  How much help from another person does the patient currently need…  -   Putting

## 2018-05-11 NOTE — CM/SW NOTE
Per HPFLOWER/Gita - appointment has been made over at [facility] for Tuesday to meet w/ financial department for [facility] application. Margoth Blanc is requesting additional referral over to [facility]. KRISTAN placed referral and requested for Rosy to reach out to JO/Gita.

## 2018-05-11 NOTE — PROGRESS NOTES
[place] [facility]    Progress Note    Aida Uribe Patient Status:  Inpatient    1950 MRN [id number]   Location [facility] 5SW/SE Attending Calista Watson MD   Hosp Day # 7 PCP Mindy Molina MD     Subjective:     Psychiatric/B  appreciated,         Hyperlipidemia- will continue with home meds   Results:     Lab Results  Component Value Date   WBC 6.2 05/05/2018   HGB 15.0 05/05/2018   HCT 44.1 05/05/2018    05/05/2018   CREATSERUM 0.87 05/05/2018   BUN 17 05/05/2018   NA

## 2018-05-11 NOTE — PHYSICAL THERAPY NOTE
Pt re-ordered for skilled PT eval. Pt has been seen for skilled PT eval on 5/6/18 and per my review,is noted to be at her baseline functional skills and does not need further skilled PT functional evel.  She is observed to be mod I/ind in her mobilities s a

## 2018-05-11 NOTE — RESTORATIVE THERAPY
RESTORATIVE CARE TREATMENT NOTE    Presenting Problem  Presenting Problem: dementia, AMS  Presenting Problem: ams;frequent falls       Precautions          Weight Bearing Restriction  Weight Bearing Restriction: None                   SUNDAY MONDAY TUESDAY

## 2018-05-11 NOTE — TELEPHONE ENCOUNTER
Kamryn Lara called to thank you for all your efforts helping and caring for patient. Also, that she has decided to keep patient at [facility] and that she has a meeting on Tuesday with administration regarding her insurance.

## 2018-05-11 NOTE — TELEPHONE ENCOUNTER
Patients Ingrid mireles states it would be better if patient was to go to [facility] in [place] states it would be easier for her being closer to her house.

## 2018-05-12 NOTE — PLAN OF CARE
Problem: Patient Centered Care  Goal: Patient preferences are identified and integrated in the patient's plan of care  Interventions:  - What would you like us to know as we care for you?  My friends help take care of me   - Provide timely, complete, and ac falls.  - [place] fall precautions as indicated by assessment.  - Educate pt/family on patient safety including physical limitations  - Instruct pt to call for assistance with activity based on assessment  - Modify environment to reduce risk of injury  -

## 2018-05-12 NOTE — PLAN OF CARE
Problem: Patient Centered Care  Goal: Patient preferences are identified and integrated in the patient's plan of care  Interventions:  - What would you like us to know as we care for you?  My friends help take care of me   - Provide timely, complete, and ac falls.  - [place] fall precautions as indicated by assessment.  - Educate pt/family on patient safety including physical limitations  - Instruct pt to call for assistance with activity based on assessment  - Modify environment to reduce risk of injury  -

## 2018-05-12 NOTE — PROGRESS NOTES
[facility]    Progress Note    Conor Roque Patient Status:  Inpatient    1950 MRN [id number]   Location [facility] 5SW/SE Attending Dominique Spangler MD   Hosp Day # 8 PCP Umair Madrigal MD     Subjective:     Psychiatric/B Results  Component Value Date   WBC 6.2 05/05/2018   HGB 15.0 05/05/2018   HCT 44.1 05/05/2018    05/05/2018   CREATSERUM 0.87 05/05/2018   BUN 17 05/05/2018    05/05/2018   K 3.8 05/05/2018    05/05/2018   CO2 24 05/05/2018    (H

## 2018-05-13 NOTE — PROGRESS NOTES
[place] [facility]    Progress Note    Jaclyn Teague Patient Status:  Inpatient    1950 MRN [id number]   Location [facility] 5SW/SE Attending Debra Frey MD   Hosp Day # 9 PCP Radha Albarado MD     Subjective:     Psychiatric/B 6.2 05/05/2018   HGB 15.0 05/05/2018   HCT 44.1 05/05/2018    05/05/2018   CREATSERUM 0.87 05/05/2018   BUN 17 05/05/2018    05/05/2018   K 3.8 05/05/2018    05/05/2018   CO2 24 05/05/2018    (H) 05/05/2018   CA 8.8 05/05/2018   A

## 2018-05-13 NOTE — PLAN OF CARE
Problem: Patient Centered Care  Goal: Patient preferences are identified and integrated in the patient's plan of care  Interventions:  - What would you like us to know as we care for you?  My friends help take care of me   - Provide timely, complete, and ac assessment  - Modify environment to reduce risk of injury  - Provide assistive devices as appropriate  - Consider OT/PT consult to assist with strengthening/mobility  - Encourage toileting schedule   Outcome: Progressing  Patient ambulating safely around u

## 2018-05-14 NOTE — PROGRESS NOTES
[facility]    Progress Note    Gerri Majano Patient Status:  Inpatient    1950 MRN [id number]   Location [facility] 5SW/SE Attending Ktaerine Lucero MD   Hosp Day # 10 PCP Cassi Trevino MD     Subjective:walking around he    Walking difficulty- as above         Agitation- stable , continue  With current meds ,            Hyperlipidemia- will continue with home meds     Awaiting placement   Results:         Results:     Lab Results  Component Value Date   WBC 6.2 05/05/2018

## 2018-05-14 NOTE — PLAN OF CARE
Problem: Patient Centered Care  Goal: Patient preferences are identified and integrated in the patient's plan of care  Interventions:  - What would you like us to know as we care for you?  My friends help take care of me   - Provide timely, complete, and ac Encourage toileting schedule   Outcome: Progressing  Patient ambulating safely in room.

## 2018-05-14 NOTE — CM/SW NOTE
KRISTAN followed up w/ Ja CORDERO - still reviewing at this time. Ja aware pt is Medicaid pending and would need application started.     KRISTAN followed up w/ [facility] - appointment confirmed for tomorrow, Tuesday 5/15, w/ financial department to begin Medicaid a

## 2018-05-15 NOTE — CM/SW NOTE
Ulises Dinh from [facility] notified SW they are unable to accept. Bashir from [facility] stated she left pt's POA/Gita a voicemail regarding appointment today 5/15.  Plan for POA/Gita to meet w/ Luz Maria Bajwa from the Financial department at [facility] to begin [facility] application a

## 2018-05-15 NOTE — PLAN OF CARE
Problem: Patient Centered Care  Goal: Patient preferences are identified and integrated in the patient's plan of care  Interventions:  - What would you like us to know as we care for you?  My friends help take care of me   - Provide timely, complete, and ac precautions as indicated by assessment.  - Educate pt/family on patient safety including physical limitations  - Instruct pt to call for assistance with activity based on assessment  - Modify environment to reduce risk of injury  - Provide assistive device

## 2018-05-15 NOTE — PROGRESS NOTES
[facility]    Progress Note     Patient Status:  Inpatient    1950 MRN [id number]   Location [facility] 5SW/SE Attending Saima Mc MD   Hosp Day # 11 PCP Cora Kapoor MD     Subjective:     Constitution above         Agitation- stable , continue  With current meds ,            Hyperlipidemia- will continue with home meds      Awaiting placement     SW note seen         Results:     Lab Results  Component Value Date   WBC 6.2 05/05/2018   HGB 15.0 05/05/20

## 2018-05-15 NOTE — PLAN OF CARE
Problem: Patient Centered Care  Goal: Patient preferences are identified and integrated in the patient's plan of care  Interventions:  - What would you like us to know as we care for you?  My friends help take care of me   - Provide timely, complete, and ac falls.  - [place] fall precautions as indicated by assessment.  - Educate pt/family on patient safety including physical limitations  - Instruct pt to call for assistance with activity based on assessment  - Modify environment to reduce risk of injury  -

## 2018-05-16 NOTE — PROGRESS NOTES
[facility]    Progress Note    Jaclyn Teague Patient Status:  Inpatient    1950 MRN [id number]   Location [facility] 5SW/SE Attending Debra Frey MD   Hosp Day # 12 PCP Radha Albarado MD     Subjective:     Constitution above         Agitation- stable , continue  With current meds ,            Hyperlipidemia- will continue with home meds      Awaiting placement      SW note seen        Results:     Lab Results  Component Value Date   WBC 6.2 05/05/2018   HGB 15.0 05/05/20

## 2018-05-16 NOTE — CM/SW NOTE
KRISTAN contacted CARINA/Gita who stated that her appointment was cancelled by [facility] yesterday due to miscommunication about pt's home and it being not sold. POA stated that plan is for her to sell pt's house and understands it has to be done sooner then later.  Per

## 2018-05-17 NOTE — PLAN OF CARE
Problem: Patient Centered Care  Goal: Patient preferences are identified and integrated in the patient's plan of care  Interventions:  - What would you like us to know as we care for you?  My friends help take care of me   - Provide timely, complete, and ac assistance with activity based on assessment  - Modify environment to reduce risk of injury  - Provide assistive devices as appropriate  - Consider OT/PT consult to assist with strengthening/mobility  - Encourage toileting schedule   Outcome: Progressing

## 2018-05-17 NOTE — PROGRESS NOTES
[facility]    [facility] Note    Memory Bear Patient Status:  Inpatient    1950 MRN [id number]   Location [facility] 5SW/SE Attending Yuly Ha MD   Hosp Day # 15 PCP Taras Chávez MD     Subjective:     Constitution above         Agitation- stable , continue  With current meds ,            Hyperlipidemia- will continue with home meds      Awaiting placement      SW note seen as per their note, SW contacted [facility] who confirmed all appropriate documents were dropped off t

## 2018-05-17 NOTE — CM/SW NOTE
SW contacted [facility] who confirmed all appropriate documents were dropped off to [facility] for Medicaid pending. [facility] financial to review documents. [facility] to call SW once paperwork has been finalized.     Parish Simmons, [address]

## 2018-05-18 NOTE — CM/SW NOTE
KRISTAN contacted [facility] - per [facility], they cannot accept pt until POA provides [facility] w/ contract for house of sale, which POA will have on Monday. KRISTAN contacted POA/Gita who stated that she is meeting w/ realtor today and will have contract for pt's home on Monday.

## 2018-05-18 NOTE — PLAN OF CARE
Problem: Patient Centered Care  Goal: Patient preferences are identified and integrated in the patient's plan of care  Interventions:  - What would you like us to know as we care for you?  My friends help take care of me   - Provide timely, complete, and ac cognitive and physical deficits and behaviors that affect risk of falls.   - [place] fall precautions as indicated by assessment.  - Educate pt/family on patient safety including physical limitations  - Instruct pt to call for assistance with activity bas

## 2018-05-18 NOTE — CM/SW NOTE
Care Coordination Rounds held 5/18/2018    Treatment team members present today include , , Charge Nurse, and nurse caring for [facility]        Patient Active Problem List:     Dementia     Hypothyroid     Osteopenia

## 2018-05-18 NOTE — PLAN OF CARE
Problem: Patient Centered Care  Goal: Patient preferences are identified and integrated in the patient's plan of care  Interventions:  - What would you like us to know as we care for you?  My friends help take care of me   - Provide timely, complete, and ac injury  INTERVENTIONS:  - Assess pt frequently for physical needs  - Identify cognitive and physical deficits and behaviors that affect risk of falls.   - [place] fall precautions as indicated by assessment.  - Educate pt/family on patient safety includin

## 2018-05-19 NOTE — PLAN OF CARE
Problem: Patient Centered Care  Goal: Patient preferences are identified and integrated in the patient's plan of care  Interventions:  - What would you like us to know as we care for you?  My friends help take care of me   - Provide timely, complete, and ac Initiate interventions, skin care algorithm/standards of care as needed   Outcome: Progressing  Patient skin will remain clean, dry, and intact. Patient is encouraged to turn and reposition self while in bed every 2 hours.  Prompt care is given to incontine

## 2018-05-19 NOTE — PLAN OF CARE
Problem: Patient Centered Care  Goal: Patient preferences are identified and integrated in the patient's plan of care  Interventions:  - What would you like us to know as we care for you?  My friends help take care of me   - Provide timely, complete, and ac needs  - Identify cognitive and physical deficits and behaviors that affect risk of falls.   - [place] fall precautions as indicated by assessment.  - Educate pt/family on patient safety including physical limitations  - Instruct pt to call for assistance

## 2018-05-19 NOTE — PROGRESS NOTES
[facility]    Progress Note    Mary Ann Tamez Patient Status:  Inpatient    1950 MRN [id number]   Location [facility] 5SW/SE Attending Blair Kerr MD   Hosp Day # 15 PCP Honey Pope MD     Subjective:     Unable to pe with home meds. Elevated blood pressure. Highs and then lows. Monitor for now.     [facility] awaiting placement possible Monday 5-21-18. Medicaid pending.      Results:     Lab Results  Component Value Date   WBC 6.2 05/05/2018   HGB 15.0 05/05/2018   H

## 2018-05-20 NOTE — PLAN OF CARE
Problem: Patient Centered Care  Goal: Patient preferences are identified and integrated in the patient's plan of care  Interventions:  - What would you like us to know as we care for you?  My friends help take care of me   - Provide timely, complete, and ac intact  INTERVENTIONS  - Assess and document risk factors for pressure ulcer development  - Assess and document skin integrity  - Monitor for areas of redness and/or skin breakdown  - Initiate interventions, skin care algorithm/standards of care as needed

## 2018-05-20 NOTE — PROGRESS NOTES
[facility]    Progress Note    Haydee Kwong Patient Status:  Inpatient    1950 MRN [id number]   Location [facility] 5SW/SE Attending Mojgan Vivar MD   Hosp Day # 16 PCP Vale Ashby MD     Subjective:     Unable to pe meds.         Elevated blood pressure. Highs and then lows. Monitor for now.     [facility] awaiting placement possible Monday 5-21-18. Medicaid pending.      Results:     Lab Results  Component Value Date   WBC 6.2 05/05/2018   HGB 15.0 05/05/2018   HCT 44.1 0

## 2018-05-21 PROBLEM — R45.1 AGITATION: Status: RESOLVED | Noted: 2017-03-06 | Resolved: 2018-05-21

## 2018-05-21 NOTE — PROGRESS NOTES
[facility]    Progress Note    Debra Holloway Patient Status:  Inpatient    1950 MRN [id number]   Location [facility] 5SW/SE Attending Pam Phillip MD   Hosp Day # 16 PCP Cathryn Oppenheim, MD     Subjective:     Unable to pe 04/02/2018   ALT 15 04/02/2018   T4F 0.95 12/21/2015   TSH 4.65 04/02/2018   B12 >1,500 (H) 04/02/2018                         Deshaun Smith MD  5/21/2018

## 2018-05-21 NOTE — CM/SW NOTE
KRISTAN contacted POA/Gita who stated that plan is still for contract to be completed today. POA stated that she would not be able to be to [facility] till around 3pm today. KRISTAN contacted [facility] liaison and notified of this.     3:45PM: KRISTAN was notified that CARINA did not

## 2018-05-21 NOTE — PLAN OF CARE
Problem: Patient Centered Care  Goal: Patient preferences are identified and integrated in the patient's plan of care  Interventions:  - What would you like us to know as we care for you?  My friends help take care of me   - Provide timely, complete, and ac

## 2018-05-22 ENCOUNTER — TELEPHONE (OUTPATIENT)
Dept: INTERNAL MEDICINE CLINIC | Facility: CLINIC | Age: 68
End: 2018-05-22

## 2018-05-22 NOTE — PLAN OF CARE
Problem: Patient Centered Care  Goal: Patient preferences are identified and integrated in the patient's plan of care  Interventions:  - What would you like us to know as we care for you?  My friends help take care of me   - Provide timely, complete, and ac [place] fall precautions as indicated by assessment.  - Educate pt/family on patient safety including physical limitations  - Instruct pt to call for assistance with activity based on assessment  - Modify environment to reduce risk of injury  - Provide a

## 2018-05-22 NOTE — PROGRESS NOTES
[facility]    Progress Note    Bianca Lock Patient Status:  Inpatient    1950 MRN [id number]   Location [facility] 5SW/SE Attending Leeanne Bowman MD   Hosp Day # 18 PCP Satnam Alvarez MD     Subjective:     Unable to pe with current meds        Hyperlipidemia- will continue with home meds.         Elevated blood pressure. fluctuates , monitor   Pending discharge ,       Results:     Lab Results  Component Value Date   WBC 6.2 05/05/2018   HGB 15.0 05/05/2018   HCT 44.1 05/

## 2018-05-22 NOTE — CM/SW NOTE
Per [facility], they are waiting on POA to send document for POA for financial, as [facility] is now requesting that information. 3:00PM: [facility] liaison notified SW that POA needs to sign direct deposit form for [facility], but is unable to get a hold of POA.  SW left VM for PO

## 2018-05-23 NOTE — CM/SW NOTE
[facility] liaison notified that all documents have been signed and are able to do discharge at [address] notified RN of d/c time. SW contacted [facility] for ambulance (elopement risk). POA aware of d/c time.     [facility] # 50 [facility], [address]

## 2018-05-23 NOTE — TELEPHONE ENCOUNTER
PA for Quetiapine Fumarate ER 50 mg tab completed with [employer] via CMM response time 3-5 business days [address] [facility].

## 2018-05-23 NOTE — PROGRESS NOTES
[facility]    Progress Note    [facility] Patient Status:  Inpatient    1950 MRN [id number]   Location [facility] 5SW/SE Attending Dustin Crowder MD   Hosp Day # 23 PCP Bruno Vo MD     Subjective:     Unable to pe 05/05/2018    (H) 05/05/2018   CA 8.8 05/05/2018   ALB 3.8 04/02/2018   ALKPHO 56 04/02/2018   BILT 0.4 04/02/2018   TP 6.1 04/02/2018   AST 20 04/02/2018   ALT 15 04/02/2018   T4F 0.95 12/21/2015   TSH 4.65 04/02/2018   B12 >1,500 (H) 04/02/2018

## 2018-05-23 NOTE — PLAN OF CARE
At 15:00 patient is scheduled to be discharged to [facility] by basic ambulance do to risk of elopement. Report given to staff RN Espinoza Gage at [facility]. Patient's POA [employer] Fees notified by  time of discharge.

## 2018-05-24 PROCEDURE — 99308 SBSQ NF CARE LOW MDM 20: CPT | Performed by: INTERNAL MEDICINE

## 2018-05-25 ENCOUNTER — SNF VISIT (OUTPATIENT)
Dept: INTERNAL MEDICINE CLINIC | Facility: SKILLED NURSING FACILITY | Age: 68
End: 2018-05-25

## 2018-05-25 DIAGNOSIS — G30.9 ALZHEIMER'S DEMENTIA WITHOUT BEHAVIORAL DISTURBANCE, UNSPECIFIED TIMING OF DEMENTIA ONSET (HCC): ICD-10-CM

## 2018-05-25 DIAGNOSIS — Z09 FOLLOW UP: ICD-10-CM

## 2018-05-25 DIAGNOSIS — R29.6 FREQUENT FALLS: ICD-10-CM

## 2018-05-25 DIAGNOSIS — F02.80 ALZHEIMER'S DEMENTIA WITHOUT BEHAVIORAL DISTURBANCE, UNSPECIFIED TIMING OF DEMENTIA ONSET (HCC): ICD-10-CM

## 2018-05-25 PROCEDURE — 99310 SBSQ NF CARE HIGH MDM 45: CPT | Performed by: NURSE PRACTITIONER

## 2018-05-25 NOTE — PROGRESS NOTES
Garcia Natarajan  : 1950  Age 75year old  female patient is admitted to [facility] AND [facility] for ROSEMARIE.     Chief complaint: Initial Assessment/Follow up Dementia    HPI  Patient is a 75year old female with medical history significant for afebrile     REVIEW OF SYSTEMS: +Confused with hx of advanced Dementia. No chest pain, no shortness of breath. No nausea, vomiting, or diarrhea. No abdominal pain. No sore throat. No cough. No dysuria. No rashes. Denies hematuria/frequency/dysuria.  D

## 2018-05-29 PROCEDURE — 99308 SBSQ NF CARE LOW MDM 20: CPT | Performed by: INTERNAL MEDICINE

## 2018-05-30 ENCOUNTER — SNF VISIT (OUTPATIENT)
Dept: INTERNAL MEDICINE CLINIC | Facility: SKILLED NURSING FACILITY | Age: 68
End: 2018-05-30

## 2018-05-30 DIAGNOSIS — Z09 FOLLOW UP: ICD-10-CM

## 2018-05-30 PROCEDURE — 99308 SBSQ NF CARE LOW MDM 20: CPT | Performed by: NURSE PRACTITIONER

## 2018-05-31 NOTE — DISCHARGE SUMMARY
DISCHARGE SUMMARY     Maria Antonia Avila Patient Status:  Inpatient    1950 MRN [id number]   [facility] 5SW/SE Attending No att. providers found   [facility] Day # 23 PCP Daniel Rivera MD     Date of Admission: 2018  Date of Discharge:  Musculoskeletal: Normal range of motion. Neurological: She is alert and oriented to person, place, and time. Skin: Skin is warm and dry. Psychiatric: She has a normal mood and affect.      •     Discharge Medication List:     Discharge Medications [address] [facility] [address]    Phone:  [phone]   · QUEtiapine Fumarate ER 50 MG Tb24         Discharge Plan:  Discharge Condition: Stable    Discharge Medication List as of 5/23/2018  2:37 PM    New Orders    QUEtiapine Fumarate ER 50 MG Oral Tablet 24 Hr

## 2018-06-05 ENCOUNTER — SNF VISIT (OUTPATIENT)
Dept: INTERNAL MEDICINE CLINIC | Facility: SKILLED NURSING FACILITY | Age: 68
End: 2018-06-05

## 2018-06-05 DIAGNOSIS — F03.90 DEMENTIA WITHOUT BEHAVIORAL DISTURBANCE, UNSPECIFIED DEMENTIA TYPE (HCC): ICD-10-CM

## 2018-06-05 DIAGNOSIS — E03.9 HYPOTHYROIDISM, UNSPECIFIED TYPE: ICD-10-CM

## 2018-06-05 DIAGNOSIS — Z09 FOLLOW UP: ICD-10-CM

## 2018-06-05 PROCEDURE — 99308 SBSQ NF CARE LOW MDM 20: CPT | Performed by: CLINICAL NURSE SPECIALIST

## 2018-06-05 RX ORDER — MULTIVIT-MIN/IRON/FOLIC ACID/K 18-600-40
2000 CAPSULE ORAL DAILY
Status: ON HOLD | COMMUNITY
End: 2020-10-20

## 2018-06-05 NOTE — PROGRESS NOTES
Kenneth Sloan : 1950 Age 75year old female patient is admitted to [facility] AND [facility] for ROSEMARIE.      Chief complaint: Follow up Dementia, hypothyroidism     HPI   Patient is a 75year old female with medical history significant for anx CODE STATUS: Full Code   CURRENT MEDICATIONS:Reviewed in SNF EMR   VITALS: WNL and afebrile   REVIEW OF SYSTEMS: +Confused with hx of advanced Dementia. No chest pain, no shortness of breath. No nausea, vomiting, or diarrhea. No abdominal pain.  No sore thro

## 2018-06-12 ENCOUNTER — SNF VISIT (OUTPATIENT)
Dept: INTERNAL MEDICINE CLINIC | Facility: SKILLED NURSING FACILITY | Age: 68
End: 2018-06-12

## 2018-06-12 DIAGNOSIS — Z09 FOLLOW UP: ICD-10-CM

## 2018-06-12 PROCEDURE — 99308 SBSQ NF CARE LOW MDM 20: CPT | Performed by: NURSE PRACTITIONER

## 2018-06-12 NOTE — PROGRESS NOTES
Haydee Kwong : 1950 Age 75year old female patient is admitted to [facility] for ROSEMARIE.      Chief complaint: Follow up and sign-off     HPI   Patient is a 75year old female with medical history significant for anxiety and dem Full Code   CURRENT MEDICATIONS:Reviewed in SNF EMR   VITALS: WNL and afebrile   REVIEW OF SYSTEMS: +Confused with hx of advanced Dementia. No chest pain, no shortness of breath. No nausea, vomiting, or diarrhea. No abdominal pain. No sore throat. No cough.

## 2018-06-14 PROCEDURE — 99308 SBSQ NF CARE LOW MDM 20: CPT | Performed by: INTERNAL MEDICINE

## 2018-06-20 RX ORDER — MEMANTINE HYDROCHLORIDE 10 MG/1
TABLET ORAL
Qty: 180 TABLET | Refills: 1 | Status: SHIPPED | OUTPATIENT
Start: 2018-06-20 | End: 2018-10-02

## 2018-06-25 ENCOUNTER — TELEPHONE (OUTPATIENT)
Dept: INTERNAL MEDICINE CLINIC | Facility: CLINIC | Age: 68
End: 2018-06-25

## 2018-06-25 NOTE — TELEPHONE ENCOUNTER
Petra Nelson is  concern about [employer], (pt at [address])  Would like to know if you can order them to weight twice a day for a month to monitoring her please.

## 2018-06-26 PROCEDURE — 99308 SBSQ NF CARE LOW MDM 20: CPT | Performed by: INTERNAL MEDICINE

## 2018-06-29 ENCOUNTER — SNF/IP PROF CHARGE ONLY (OUTPATIENT)
Dept: INTERNAL MEDICINE CLINIC | Facility: CLINIC | Age: 68
End: 2018-06-29

## 2018-06-29 DIAGNOSIS — F39 EPISODIC MOOD DISORDER (HCC): ICD-10-CM

## 2018-06-29 DIAGNOSIS — R03.0 ELEVATED BP WITHOUT DIAGNOSIS OF HYPERTENSION: ICD-10-CM

## 2018-06-29 DIAGNOSIS — M54.2 CHRONIC NECK PAIN: ICD-10-CM

## 2018-06-29 DIAGNOSIS — R26.2 DIFFICULTY WALKING: ICD-10-CM

## 2018-06-29 DIAGNOSIS — R29.6 FREQUENT FALLS: ICD-10-CM

## 2018-06-29 DIAGNOSIS — F01.50 VASCULAR DEMENTIA WITHOUT BEHAVIORAL DISTURBANCE (HCC): ICD-10-CM

## 2018-06-29 DIAGNOSIS — E03.9 HYPOTHYROIDISM, UNSPECIFIED TYPE: ICD-10-CM

## 2018-06-29 DIAGNOSIS — G89.29 CHRONIC NECK PAIN: ICD-10-CM

## 2018-06-29 DIAGNOSIS — E78.5 HYPERLIPIDEMIA, UNSPECIFIED HYPERLIPIDEMIA TYPE: ICD-10-CM

## 2018-07-10 ENCOUNTER — TELEPHONE (OUTPATIENT)
Dept: INTERNAL MEDICINE CLINIC | Facility: CLINIC | Age: 68
End: 2018-07-10

## 2018-07-13 PROCEDURE — 99308 SBSQ NF CARE LOW MDM 20: CPT | Performed by: INTERNAL MEDICINE

## 2018-07-18 ENCOUNTER — SNF/IP PROF CHARGE ONLY (OUTPATIENT)
Dept: INTERNAL MEDICINE CLINIC | Facility: CLINIC | Age: 68
End: 2018-07-18

## 2018-07-18 DIAGNOSIS — E78.5 HYPERLIPIDEMIA, UNSPECIFIED HYPERLIPIDEMIA TYPE: ICD-10-CM

## 2018-07-18 DIAGNOSIS — F02.80 ALZHEIMER'S DEMENTIA WITHOUT BEHAVIORAL DISTURBANCE, UNSPECIFIED TIMING OF DEMENTIA ONSET (HCC): ICD-10-CM

## 2018-07-18 DIAGNOSIS — F03.90 DEMENTIA WITHOUT BEHAVIORAL DISTURBANCE, UNSPECIFIED DEMENTIA TYPE (HCC): ICD-10-CM

## 2018-07-18 DIAGNOSIS — R26.2 DIFFICULTY WALKING: ICD-10-CM

## 2018-07-18 DIAGNOSIS — G89.29 CHRONIC NECK PAIN: ICD-10-CM

## 2018-07-18 DIAGNOSIS — G30.9 ALZHEIMER'S DEMENTIA WITHOUT BEHAVIORAL DISTURBANCE, UNSPECIFIED TIMING OF DEMENTIA ONSET (HCC): ICD-10-CM

## 2018-07-18 DIAGNOSIS — M54.2 CHRONIC NECK PAIN: ICD-10-CM

## 2018-07-18 DIAGNOSIS — E03.9 HYPOTHYROIDISM, UNSPECIFIED TYPE: ICD-10-CM

## 2018-07-18 DIAGNOSIS — R29.6 FREQUENT FALLS: ICD-10-CM

## 2018-08-23 ENCOUNTER — SNF/IP PROF CHARGE ONLY (OUTPATIENT)
Dept: INTERNAL MEDICINE CLINIC | Facility: CLINIC | Age: 68
End: 2018-08-23

## 2018-08-23 DIAGNOSIS — E03.9 HYPOTHYROIDISM, UNSPECIFIED TYPE: ICD-10-CM

## 2018-08-23 DIAGNOSIS — R29.6 FREQUENT FALLS: ICD-10-CM

## 2018-08-23 DIAGNOSIS — F01.50 VASCULAR DEMENTIA WITHOUT BEHAVIORAL DISTURBANCE (HCC): ICD-10-CM

## 2018-08-23 DIAGNOSIS — F39 EPISODIC MOOD DISORDER (HCC): ICD-10-CM

## 2018-08-23 PROCEDURE — 99307 SBSQ NF CARE SF MDM 10: CPT | Performed by: INTERNAL MEDICINE

## 2018-09-18 ENCOUNTER — TELEPHONE (OUTPATIENT)
Dept: INTERNAL MEDICINE CLINIC | Facility: CLINIC | Age: 68
End: 2018-09-18

## 2018-09-19 NOTE — TELEPHONE ENCOUNTER
Skin skin dry skin on forehead. Just noticed by family today. No itching. No trauma. Eucerin q12hrs. For 1 week. Follow up with PCP. Message to PCP.

## 2018-10-01 ENCOUNTER — SNF VISIT (OUTPATIENT)
Dept: INTERNAL MEDICINE CLINIC | Facility: SKILLED NURSING FACILITY | Age: 68
End: 2018-10-01

## 2018-10-01 DIAGNOSIS — R21 ERYTHEMATOUS RASH: ICD-10-CM

## 2018-10-01 DIAGNOSIS — Z79.899 ENCOUNTER FOR MEDICATION REVIEW: ICD-10-CM

## 2018-10-01 PROCEDURE — 99308 SBSQ NF CARE LOW MDM 20: CPT | Performed by: CLINICAL NURSE SPECIALIST

## 2018-10-02 ENCOUNTER — SNF/IP PROF CHARGE ONLY (OUTPATIENT)
Dept: INTERNAL MEDICINE CLINIC | Facility: CLINIC | Age: 68
End: 2018-10-02

## 2018-10-02 DIAGNOSIS — B08.3 ERYTHEMA INFECTIOSUM (FIFTH DISEASE): ICD-10-CM

## 2018-10-02 DIAGNOSIS — R21 RASH: ICD-10-CM

## 2018-10-02 PROCEDURE — 99309 SBSQ NF CARE MODERATE MDM 30: CPT | Performed by: INTERNAL MEDICINE

## 2018-10-02 RX ORDER — AMLODIPINE BESYLATE 5 MG/1
5 TABLET ORAL DAILY
Status: ON HOLD | COMMUNITY
End: 2020-10-20

## 2018-10-02 NOTE — PROGRESS NOTES
Socorro Mendoza is a 69 yo female, a long term resident of [facility] w/ past medical hx significant for dementia and anxiety. Asked by PCP to see pt today due to reported red rash on bilateral cheeks.     Upon arrival, pt ambulating freely in

## 2018-10-08 ENCOUNTER — TELEPHONE (OUTPATIENT)
Dept: INTERNAL MEDICINE CLINIC | Facility: CLINIC | Age: 68
End: 2018-10-08

## 2018-10-08 NOTE — TELEPHONE ENCOUNTER
Gordo Camara from [address] [facility] states that pt's BUN is 34, will be faxing lab report.  Gordo Camara ph # [phone]

## 2018-10-29 ENCOUNTER — TELEPHONE (OUTPATIENT)
Dept: INTERNAL MEDICINE CLINIC | Facility: CLINIC | Age: 68
End: 2018-10-29

## 2018-10-29 NOTE — TELEPHONE ENCOUNTER
As far as finding another facility they can look in to it but will need to make sure they take her current insurance. I will discuss with the nursing staff also about the concern.

## 2018-10-29 NOTE — TELEPHONE ENCOUNTER
Patient daughter is very concern about [facility] . She states they have found her mother feeding the roommate which does not talk or walk. They are afraid something else might happen and they doesn't seem to care .  They have told the nursing s

## 2018-10-29 NOTE — TELEPHONE ENCOUNTER
Spoke with Regina Hernandez, aware that Dr. Rogelio Mcgill will speak with nursing staff at [facility]. Verbalized understanding.

## 2019-01-04 ENCOUNTER — MED REC SCAN ONLY (OUTPATIENT)
Dept: INTERNAL MEDICINE CLINIC | Facility: CLINIC | Age: 69
End: 2019-01-04

## 2019-06-14 NOTE — PROGRESS NOTES
[facility]    Progress Note    Veverly Fanti Patient Status:  Inpatient    1950 MRN [id number]   Location [facility] 5SW/SE Attending Delfina Ferrara MD   Hosp Day # 14 PCP Warner Contreras MD     Subjective:     Unable to pe Impression: Combined forms of age-related cataract, bilateral: H25.813. Plan: Will continue to observe condition and or symptoms. Lab Results  Component Value Date   WBC 6.2 05/05/2018   HGB 15.0 05/05/2018   HCT 44.1 05/05/2018    05/05/2018   CREATSERUM 0.87 05/05/2018   BUN 17 05/05/2018    05/05/2018   K 3.8 05/05/2018    05/05/2018   CO2 24 05/05/2018   GL

## 2019-06-21 ENCOUNTER — MED REC SCAN ONLY (OUTPATIENT)
Dept: INTERNAL MEDICINE CLINIC | Facility: CLINIC | Age: 69
End: 2019-06-21

## 2020-06-06 ENCOUNTER — HOSPITAL ENCOUNTER (EMERGENCY)
Facility: HOSPITAL | Age: 70
Discharge: HOME | End: 2020-06-06
Attending: EMERGENCY MEDICINE
Payer: MEDICARE

## 2020-06-06 ENCOUNTER — APPOINTMENT (OUTPATIENT)
Dept: CT IMAGING | Facility: HOSPITAL | Age: 70
End: 2020-06-06
Attending: EMERGENCY MEDICINE
Payer: MEDICARE

## 2020-06-06 VITALS
OXYGEN SATURATION: 97 % | BODY MASS INDEX: 26 KG/M2 | SYSTOLIC BLOOD PRESSURE: 106 MMHG | RESPIRATION RATE: 18 BRPM | DIASTOLIC BLOOD PRESSURE: 64 MMHG | WEIGHT: 132.5 LBS | TEMPERATURE: 99 F | HEART RATE: 62 BPM

## 2020-06-06 DIAGNOSIS — S01.01XA SCALP LACERATION, INITIAL ENCOUNTER: Primary | ICD-10-CM

## 2020-06-06 PROCEDURE — 70450 CT HEAD/BRAIN W/O DYE: CPT | Performed by: EMERGENCY MEDICINE

## 2020-06-06 PROCEDURE — 12001 RPR S/N/AX/GEN/TRNK 2.5CM/<: CPT

## 2020-06-06 PROCEDURE — 99284 EMERGENCY DEPT VISIT MOD MDM: CPT

## 2020-06-06 NOTE — ED NOTES
Report given to Gala Campos at [facility] called for transport. ETA 30 mins.

## 2020-06-06 NOTE — ED INITIAL ASSESSMENT (HPI)
The patient arrived via EMS form [facility] 80 following an unwitnessed fall from bed with a laceration to her posterior scalp. The patient is reported oriented to herself only at baseline per her norm. Bleeding is controlled.  No blood thinner re

## 2020-06-06 NOTE — ED PROVIDER NOTES
Patient Seen in: [facility] AND [facility] Emergency Department      History   Patient presents with:  Laceration Abrasion    Stated Complaint: fall head lac    HPI    79-year-old female from nursing facility with past medical history significant for Alzheimer's s1s2+, RRR, no m/r/g, normal distal pulses  Pulmonary/Chest: CTA b/l with no rales, wheezes. No chest wall tenderness  Abdominal: Nontender. Nondistended. Soft. Bowel sounds are normal.   Back:   : Musculoskeletal: Normal range of motion.  No deformit with 500 mL NS  Sterile precautions and prep. 2.5 cm laceration which is linear in appearance. Laceration was simple without foreign body or galeal involvement. Wound repaired with 2 staples. Sterile dressing. Patient tolerated well.     Patient's teta

## 2020-06-06 NOTE — ED NOTES
Laceration to the back of the head irrigated with saline by [facility] PCT, wound approx. 1.5 inches.

## 2020-07-30 ENCOUNTER — LAB REQUISITION (OUTPATIENT)
Dept: LAB | Facility: HOSPITAL | Age: 70
End: 2020-07-30
Payer: MEDICARE

## 2020-07-30 DIAGNOSIS — Z20.828 CONTACT WITH AND (SUSPECTED) EXPOSURE TO OTHER VIRAL COMMUNICABLE DISEASES: ICD-10-CM

## 2020-08-02 LAB — SARS-COV-2 BY PCR: NOT DETECTED

## 2020-08-06 ENCOUNTER — LAB REQUISITION (OUTPATIENT)
Dept: LAB | Facility: HOSPITAL | Age: 70
End: 2020-08-06
Payer: MEDICARE

## 2020-08-06 DIAGNOSIS — Z20.828 CONTACT WITH AND (SUSPECTED) EXPOSURE TO OTHER VIRAL COMMUNICABLE DISEASES: ICD-10-CM

## 2020-08-10 LAB — SARS-COV-2 BY PCR: NOT DETECTED

## 2020-08-15 ENCOUNTER — APPOINTMENT (OUTPATIENT)
Dept: CT IMAGING | Facility: HOSPITAL | Age: 70
End: 2020-08-15
Attending: EMERGENCY MEDICINE
Payer: MEDICARE

## 2020-08-15 ENCOUNTER — HOSPITAL ENCOUNTER (EMERGENCY)
Facility: HOSPITAL | Age: 70
Discharge: HOME | End: 2020-08-15
Attending: EMERGENCY MEDICINE
Payer: MEDICARE

## 2020-08-15 VITALS
SYSTOLIC BLOOD PRESSURE: 119 MMHG | HEART RATE: 71 BPM | DIASTOLIC BLOOD PRESSURE: 69 MMHG | OXYGEN SATURATION: 96 % | TEMPERATURE: 98 F | RESPIRATION RATE: 18 BRPM

## 2020-08-15 DIAGNOSIS — S02.2XXB OPEN FRACTURE OF NASAL BONE, INITIAL ENCOUNTER: ICD-10-CM

## 2020-08-15 DIAGNOSIS — S01.21XA LACERATION OF NOSE, INITIAL ENCOUNTER: ICD-10-CM

## 2020-08-15 DIAGNOSIS — S09.8XXA BLUNT HEAD TRAUMA, INITIAL ENCOUNTER: Primary | ICD-10-CM

## 2020-08-15 PROCEDURE — 70486 CT MAXILLOFACIAL W/O DYE: CPT | Performed by: EMERGENCY MEDICINE

## 2020-08-15 PROCEDURE — 70450 CT HEAD/BRAIN W/O DYE: CPT | Performed by: EMERGENCY MEDICINE

## 2020-08-15 PROCEDURE — 99284 EMERGENCY DEPT VISIT MOD MDM: CPT

## 2020-08-15 PROCEDURE — 21310 HC CLOSED TX NASAL BONE FX WITHOUT MANIPULATION: CPT

## 2020-08-15 PROCEDURE — 96372 THER/PROPH/DIAG INJ SC/IM: CPT

## 2020-08-15 RX ORDER — CEPHALEXIN 500 MG/1
500 CAPSULE ORAL 4 TIMES DAILY
Qty: 20 CAPSULE | Refills: 0 | Status: SHIPPED | OUTPATIENT
Start: 2020-08-15 | End: 2020-08-20

## 2020-08-15 RX ORDER — LORAZEPAM 2 MG/ML
2 INJECTION INTRAMUSCULAR ONCE
Status: COMPLETED | OUTPATIENT
Start: 2020-08-15 | End: 2020-08-15

## 2020-08-15 NOTE — ED PROVIDER NOTES
Patient Seen in: [facility] AND [facility] Emergency Department      History   Patient presents with:  Fall    Stated Complaint: fall    HPI    67-year-old female with past medical history significant for anxiety, Alzheimer's disease, presents to the emergency d tenderness  Abdominal: Nontender. Nondistended. Soft. Bowel sounds are normal.   Back:   : Musculoskeletal: Normal range of motion. No deformity. Lymphadenopathy: No sig cervical LAD   Neurological: Awake, alert. Normal reflexes.  No cranial nerve de acute intracranial abnormality. 2. Mild to moderate bilateral superior nasal soft tissue swelling with right-sided soft tissue laceration containing a tiny, approximately 1-2 mm focus of radiopaque debris.    3. Acute mildly comminuted, mildly displaced f mouth 4 (four) times daily for 5 days.   Qty: 20 capsule Refills: 0

## 2020-08-15 NOTE — ED INITIAL ASSESSMENT (HPI)
Pt to ed via ems after fall from nh. Per ems pt fell and hit her nose. Pt has noted lac to bridge of nose. No blood thinners. Pt is confused @baseline.

## 2020-09-02 ENCOUNTER — LAB REQUISITION (OUTPATIENT)
Dept: LAB | Facility: HOSPITAL | Age: 70
End: 2020-09-02
Payer: MEDICARE

## 2020-09-02 DIAGNOSIS — Z20.828 CONTACT WITH AND (SUSPECTED) EXPOSURE TO OTHER VIRAL COMMUNICABLE DISEASES: ICD-10-CM

## 2020-09-07 LAB — SARS-COV-2 BY PCR: NOT DETECTED

## 2020-09-09 ENCOUNTER — LAB REQUISITION (OUTPATIENT)
Dept: LAB | Facility: HOSPITAL | Age: 70
End: 2020-09-09
Payer: MEDICARE

## 2020-09-09 DIAGNOSIS — Z20.828 CONTACT WITH AND (SUSPECTED) EXPOSURE TO OTHER VIRAL COMMUNICABLE DISEASES: ICD-10-CM

## 2020-09-12 LAB — SARS-COV-2 BY PCR: NOT DETECTED

## 2020-09-17 ENCOUNTER — LAB REQUISITION (OUTPATIENT)
Dept: LAB | Facility: HOSPITAL | Age: 70
End: 2020-09-17
Payer: MEDICARE

## 2020-09-17 DIAGNOSIS — Z20.828 CONTACT WITH AND (SUSPECTED) EXPOSURE TO OTHER VIRAL COMMUNICABLE DISEASES: ICD-10-CM

## 2020-09-23 ENCOUNTER — LAB REQUISITION (OUTPATIENT)
Dept: LAB | Facility: HOSPITAL | Age: 70
End: 2020-09-23
Payer: MEDICARE

## 2020-09-23 DIAGNOSIS — Z20.828 CONTACT WITH AND (SUSPECTED) EXPOSURE TO OTHER VIRAL COMMUNICABLE DISEASES: ICD-10-CM

## 2020-10-06 ENCOUNTER — APPOINTMENT (OUTPATIENT)
Dept: GENERAL RADIOLOGY | Facility: HOSPITAL | Age: 70
DRG: 177 | End: 2020-10-06
Attending: EMERGENCY MEDICINE
Payer: MEDICARE

## 2020-10-06 ENCOUNTER — APPOINTMENT (OUTPATIENT)
Dept: CT IMAGING | Facility: HOSPITAL | Age: 70
DRG: 177 | End: 2020-10-06
Attending: EMERGENCY MEDICINE
Payer: MEDICARE

## 2020-10-06 ENCOUNTER — HOSPITAL ENCOUNTER (INPATIENT)
Facility: HOSPITAL | Age: 70
LOS: 14 days | Discharge: HOME | DRG: 177 | End: 2020-10-20
Attending: EMERGENCY MEDICINE | Admitting: INTERNAL MEDICINE
Payer: MEDICARE

## 2020-10-06 DIAGNOSIS — U07.1 COVID-19 VIRUS INFECTION: ICD-10-CM

## 2020-10-06 DIAGNOSIS — E86.0 DEHYDRATION: ICD-10-CM

## 2020-10-06 DIAGNOSIS — F03.91 DEMENTIA WITH BEHAVIORAL DISTURBANCE, UNSPECIFIED DEMENTIA TYPE (HCC): ICD-10-CM

## 2020-10-06 DIAGNOSIS — G93.40 ACUTE ENCEPHALOPATHY: Primary | ICD-10-CM

## 2020-10-06 DIAGNOSIS — E87.0 HYPERNATREMIA: ICD-10-CM

## 2020-10-06 PROCEDURE — 71260 CT THORAX DX C+: CPT | Performed by: EMERGENCY MEDICINE

## 2020-10-06 PROCEDURE — 71045 X-RAY EXAM CHEST 1 VIEW: CPT | Performed by: EMERGENCY MEDICINE

## 2020-10-06 PROCEDURE — 70450 CT HEAD/BRAIN W/O DYE: CPT | Performed by: EMERGENCY MEDICINE

## 2020-10-06 RX ORDER — LORAZEPAM 2 MG/ML
INJECTION INTRAMUSCULAR
Status: COMPLETED
Start: 2020-10-06 | End: 2020-10-06

## 2020-10-06 RX ORDER — HALOPERIDOL 5 MG/ML
1 INJECTION INTRAMUSCULAR EVERY 6 HOURS PRN
Status: DISCONTINUED | OUTPATIENT
Start: 2020-10-06 | End: 2020-10-08

## 2020-10-06 RX ORDER — SODIUM CHLORIDE 9 MG/ML
INJECTION, SOLUTION INTRAVENOUS CONTINUOUS
Status: ACTIVE | OUTPATIENT
Start: 2020-10-06 | End: 2020-10-07

## 2020-10-06 RX ORDER — QUETIAPINE 25 MG/1
25 TABLET, FILM COATED ORAL DAILY
Status: ON HOLD | COMMUNITY
End: 2020-10-20

## 2020-10-06 RX ORDER — LORAZEPAM 2 MG/ML
1 INJECTION INTRAMUSCULAR ONCE
Status: COMPLETED | OUTPATIENT
Start: 2020-10-06 | End: 2020-10-06

## 2020-10-06 RX ORDER — SODIUM CHLORIDE 9 MG/ML
INJECTION, SOLUTION INTRAVENOUS CONTINUOUS
Status: DISCONTINUED | OUTPATIENT
Start: 2020-10-06 | End: 2020-10-08

## 2020-10-06 RX ORDER — SODIUM CHLORIDE 9 MG/ML
125 INJECTION, SOLUTION INTRAVENOUS CONTINUOUS
Status: DISCONTINUED | OUTPATIENT
Start: 2020-10-06 | End: 2020-10-07

## 2020-10-06 NOTE — ED NOTES
Spoke with NH RN Karina Silva, pt not eating will clamp mouth shut, pt    Not cooperative to care, confused at baseline and does not talk however increase confusion not following commands, Dr. Juancarlos Christianson aware

## 2020-10-06 NOTE — ED INITIAL ASSESSMENT (HPI)
Triage: pt arrived via private ambulance from [facility] for altered mental status, positive for COVID since 9/27, pt combative and pulling lines per ems, pt arrived with IO to L tibia with IV fluids running

## 2020-10-06 NOTE — ED NOTES
Pt noted to be still moving around the cart after 2nd dose of ativan, not ready for CT scan Dr. Alyssa Diaz aware

## 2020-10-06 NOTE — ED NOTES
Orders for admission, patient is aware of plan and ready to go upstairs. Any questions, please call ED PHIL Dai Postal at extension [phone].    Type of COVID test sent: pt is positive for COVID  COVID Suspicion level: High    Titratable drug(s) infusing:.9 @ 125cc/

## 2020-10-06 NOTE — ED NOTES
Noted to have pulled IO upon arrival to room, soft wrist restraints applied due to pt confusion and pulling lines per Dr. Valery Sosa order

## 2020-10-06 NOTE — ED NOTES
69year old female here with altered mental status and failure to thrive, pt combative with transport team, uncooperative with staff, pulling at lines, pt diagnoses with Covid end of september

## 2020-10-06 NOTE — ED PROVIDER NOTES
Patient Seen in: [facility] AND [facility] Emergency Department      History   Patient presents with:  Altered Mental Status    Stated Complaint: +covid, \"failure to thrive\"    HPI    The patient is a 50-year-old female diagnosed with COVID on 9/27 who was sen Conjunctiva/sclera: Conjunctivae normal.      Pupils: Pupils are equal, round, and reactive to light. Neck:      Musculoskeletal: Normal range of motion and neck supple. Vascular: No JVD.    Cardiovascular:      Rate and Rhythm: Normal rate and regul within normal limits   LDH - Abnormal; Notable for the following components:     (*)     All other components within normal limits   C-REACTIVE PROTEIN - Abnormal; Notable for the following components:    C-Reactive Protein 2.70 (*)     All other co stable oxygen 98% on room air. No evidence of PE. Will admit for further management with psychiatry on consult. Patient much more calm and cooperative with Zyprexa. Vital signs stable prior to admission.         [facility]      Pulse Ox: 96%, Normal, room air Disposition and Plan     Clinical Impression:  Acute encephalopathy  (primary encounter diagnosis)  Dementia with behavioral disturbance, unspecified dementia type ([facility]  COVID-19 virus infection  Dehydration  Hypernatremia    Disposition:  A

## 2020-10-07 PROBLEM — E78.5 HYPERLIPIDEMIA: Chronic | Status: ACTIVE | Noted: 2017-03-06

## 2020-10-07 PROBLEM — F03.91 DEMENTIA WITH BEHAVIORAL DISTURBANCE, UNSPECIFIED DEMENTIA TYPE: Chronic | Status: ACTIVE | Noted: 2020-10-06

## 2020-10-07 PROBLEM — F03.91 DEMENTIA WITH BEHAVIORAL DISTURBANCE, UNSPECIFIED DEMENTIA TYPE (HCC): Chronic | Status: ACTIVE | Noted: 2020-10-06

## 2020-10-07 PROCEDURE — 90792 PSYCH DIAG EVAL W/MED SRVCS: CPT | Performed by: OTHER

## 2020-10-07 RX ORDER — HALOPERIDOL 5 MG/ML
0.5 INJECTION INTRAMUSCULAR 3 TIMES DAILY
Status: DISCONTINUED | OUTPATIENT
Start: 2020-10-07 | End: 2020-10-08

## 2020-10-07 RX ORDER — 0.9 % SODIUM CHLORIDE 0.9 %
3 VIAL (ML) INJECTION AS NEEDED
Status: DISCONTINUED | OUTPATIENT
Start: 2020-10-07 | End: 2020-10-20

## 2020-10-07 RX ORDER — ASCORBIC ACID 500 MG
500 TABLET ORAL DAILY
Status: DISCONTINUED | OUTPATIENT
Start: 2020-10-07 | End: 2020-10-13

## 2020-10-07 RX ORDER — LEVOTHYROXINE SODIUM 0.05 MG/1
50 TABLET ORAL
Status: DISCONTINUED | OUTPATIENT
Start: 2020-10-08 | End: 2020-10-16

## 2020-10-07 RX ORDER — HEPARIN SODIUM 5000 [USP'U]/ML
5000 INJECTION, SOLUTION INTRAVENOUS; SUBCUTANEOUS EVERY 8 HOURS SCHEDULED
Status: DISCONTINUED | OUTPATIENT
Start: 2020-10-07 | End: 2020-10-20

## 2020-10-07 RX ORDER — GARLIC EXTRACT 500 MG
1 CAPSULE ORAL DAILY
Status: ON HOLD | COMMUNITY
End: 2020-10-20

## 2020-10-07 NOTE — PROGRESS NOTES
New admit: Pt covid + on 9/23. Currently on RA, SpO2 99%, afebrile. Inflammatory markers trending down. Increased confusion from baseline and soft wrist restraints in place. Started on heparin q8h. Will continue to monitor.     Ulysses Bristol, APRN  10/7/2020

## 2020-10-07 NOTE — PAYOR COMM NOTE
--------------  ADMISSION REVIEW     Payor: [id number] #:  [id number]  Authorization Number: [id number]    Admit date: 10/6/20  Admit time: 2143       Admitting Physician: Jazmin Marroquin MD  Attending Physician:  Jazmin Marroquin MD  P above.    Physical Exam     ED Triage Vitals [10/06/20 1452]   BP (!) 127/101   Pulse 109   Resp 22   Temp 98.4 °F (36.9 °C)   Temp src Temporal   SpO2 96 %   O2 Device        Current:/57   Pulse 95   Temp 98.4 °F (36.9 °C) (Temporal)   Resp 22   LMP Sodium 153 (*)     Chloride 120 (*)     BUN 59 (*)     Creatinine 1.40 (*)     BUN/CREA Ratio 42.1 (*)     Calculated Osmolality 334 (*)     GFR, Non- 38 (*)     GFR, -American 44 (*)     All other components within normal limits tests on the individual orders.    MANUAL DIFFERENTIAL   MD BLOOD SMEAR CONSULT   RAINBOW DRAW BLUE   RAINBOW DRAW LAVENDER   RAINBOW DRAW LIGHT GREEN   RAINBOW DRAW GOLD   BLOOD CULTURE   BLOOD CULTURE     EKG    Rate, intervals and axes as noted on EKG Re dependent atelectasis or mild changes of atypical/viral pneumonia. 1.1 cm indeterminate left lower lobe pulmonary nodule. Advise correlation with PET-CT when clinically appropriate. Additional 0.5 cm right lower lobe pulmonary nodule.   Consider follow-u 10/6/2020    History provided by:patient and NH staff. HPI:   Patient presents with:  Altered Mental Status    HPI Increasing confusion associated with deteriorating sensorium and poor oral intake. Pt tested (+) for COVID 19 on 9/23.  Initial inflammatory units Oral Cap, Take 2,000 Units by mouth daily. •  QUEtiapine Fumarate ER 50 MG Oral Tablet 24 Hr, Take 1 tablet (50 mg total) by mouth nightly.     •  LEVOTHYROXINE SODIUM 50 MCG Oral Tab, TAKE 1 TABLET BY MOUTH BEFORE BREAKFAST    •  Vitamin C 500 MG 10/07/2020     (H) 10/07/2020    CO2 25.0 10/07/2020     (H) 10/07/2020    CA 8.6 10/07/2020    ALB 3.8 10/06/2020    ALKPHO 124 10/06/2020    BILT 0.6 10/06/2020    TP 8.1 10/06/2020    AST 25 10/06/2020    ALT 26 10/06/2020    T4F 0.95 12/21 = 118 -Possible Old inferior infarct ABNORMAL No previous ECGs available Electronically signed on 10/06/2020 at 15:16 by Ronny Ricci MD      Assessment/Plan:     Acute encephalopathy  Likely related to COVID infection. Agitation still problematic.  Psych mg Intramuscular (Right Deltoid) Susan Hancock RN      0.9% NaCl infusion     Date Action Dose Route User    10/7/2020 0549 New Bag 125 mL/hr Intravenous Milena Bates RN    10/6/2020 1518 New Bag 125 mL/hr Intravenous Susan Hancock RN      0.9% NaC

## 2020-10-07 NOTE — CM/SW NOTE
KRISTAN self-referral for pt assessment/d/c planning. KRISTAN received a call from [facility] [address]. Pt is a long-term resident from [facility]. Liaison is aware of Covid+ status. KRISTAN sent updated clinical info to [facility] via [employer].     Plan: Once m

## 2020-10-07 NOTE — BH PROGRESS NOTE
Behavioral Health Note:  REASON FOR ADMISSION:   AMS, failure to thrive per nursing home, COVID+    REASON FOR CONSULT:  Patient with exhibited bx disturbances and agitation with hx Alzheimer's dx.  Dr. Palomo Saha requesting Psychiatry Consultation for evaluat eat and will spit out the food she does allow to be given to her. Otherwise, for the 2-3 prior to this onset she had gained >10 pounds in 6-8 weeks d/t excessive eating, reporting that she's been said repeatedly try stealing food from other residents.  Regina Hernandez

## 2020-10-07 NOTE — PLAN OF CARE
Pt denies pain, sob, chills, fever, n/v. Medication reviewed. VS monitor. Pt is in restraints due to agitation and pulling the IV line. No S&S of injuries. Safety maintained. Call light in reach. We will continue to monitor.   Problem: Safety Risk - Non-Viol for pain    Interventions:   -Medication revies  - See additional Care Plan goals for specific interventions  Outcome: Progressing     Problem: Patient Centered Care  Goal: Patient preferences are identified and integrated in the patient's plan of care  Terril Dance

## 2020-10-07 NOTE — H&P
[facility] HOSP - [facility]    History and Physical    Debra Holloway Patient Status:  Inpatient    1950 MRN [id number]   Location [facility]W Attending Nawaf Panchal MD   Hosp Day # 1 PCP Joe Knapp MD     Date:  10/7/2020  Date of A related to other psychotic disorder. Not due to a substance or known physiological condition    •  AmLODIPine Besylate 5 MG Oral Tab, Take 5 mg by mouth daily.     •  SERTRALINE HCL 50 MG Oral Tab, TAKE 1 TABLET(50 MG) BY MOUTH DAILY    •  Cholecalciferol ( Papanicolaou contraindicated    Results:     Lab Results   Component Value Date    WBC 8.0 10/07/2020    HGB 15.6 10/07/2020    HCT 48.5 (H) 10/07/2020    .0 10/07/2020    CREATSERUM 1.03 (H) 10/07/2020    BUN 46 (H) 10/07/2020     (H) 10/07/2 by (CST): Rachel Cunningham MD on 10/06/2020 at 8:05 PM     Finalized by (CST): Rachel Cunningham MD on 10/06/2020 at 8:14 PM          Ekg 12-lead    Result Date: 10/6/2020  ECG Report  Interpretation  -------------------------- Sinus Tachycardia -Short [place]

## 2020-10-08 PROCEDURE — 99232 SBSQ HOSP IP/OBS MODERATE 35: CPT | Performed by: OTHER

## 2020-10-08 RX ORDER — HALOPERIDOL 5 MG/ML
1 INJECTION INTRAMUSCULAR EVERY 6 HOURS PRN
Status: DISCONTINUED | OUTPATIENT
Start: 2020-10-08 | End: 2020-10-20

## 2020-10-08 NOTE — PROGRESS NOTES
Pt currently on RA, SpO2 97%, afebrile. Continues with confusion, soft wrist restraints in place. Seen by Dr. Shruthi Combs on 10/7 and started on Depakote IV and PRN haldol to help stabilize mood. On heparin q8h. Will continue to monitor.     Ulysses Bristol, APRN

## 2020-10-08 NOTE — PLAN OF CARE
Problem: Patient Centered Care  Goal: Patient preferences are identified and integrated in the patient's plan of care  Description: Interventions:  - What would you like us to know as we care for you? Pt is from [address] [place] [address].    - Provide timely, complete, a injury  Description: INTERVENTIONS:  - Assess pt frequently for physical needs  - Identify cognitive and physical deficits and behaviors that affect risk of falls.   - [place] fall precautions as indicated by assessment.  - Educate pt/family on patient sa condition, hydration, nutrition and elimination needs   - Reorient and redirection as needed  - Assess for the need to continue restraints  Outcome: Not Progressing     Problem: RESPIRATORY - ADULT  Goal: Achieves optimal ventilation and oxygenation  Descr

## 2020-10-08 NOTE — PLAN OF CARE
Spoke w/ [facility], pharmacist regarding current IV order of D5 0.2%Nacl with 10 meq KCL and sh said it's ok.

## 2020-10-08 NOTE — DIETARY NOTE
ADULT NUTRITION INITIAL ASSESSMENT    Pt is at moderate nutrition risk.   Pt does not meet malnutrition criteria, although unable to conduct face to face interview or NFPE with pt d/t limited contact restrictions due to their medical condition and isolati vitamin D--PTA    - Feeding assistance: meal set up and feed    - Nutrition education: none needed    - Coordination of nutrition care: collaboration with other providers    - Discharge and transfer of nutrition care to new setting or provider: monitor giana No  Cultural/Ethnic/Bahai Preferences: Not Obtained    MEDICATIONS: reviewed  • Heparin Sodium (Porcine)  5,000 Units Subcutaneous Q8H [facility]   • cholecalciferol  2,000 Units Oral Daily   • Levothyroxine Sodium  50 mcg Oral QAM AC   • Vitamin C  500 mg Or

## 2020-10-08 NOTE — PLAN OF CARE
Restraints on bilateral wrist for protection, aquacel on bilateral wrist for protection. Patient is alert to self, able to nod her head to her name. Haldol given x1,for restlessness and attempting to pull lines.  Dr. Margarita Molina and Jose Elias Bocanegra spoke to CRV, car anxiety  - Monitor for signs/symptoms of CO2 retention  Outcome: Progressing     Problem: SKIN/TISSUE INTEGRITY - ADULT  Goal: Skin integrity remains intact  Description: INTERVENTIONS  - Assess and document risk factors for pressure ulcer development  - A assistance with activity based on assessment  - Modify environment to reduce risk of injury  - Provide assistive devices as appropriate  - Consider OT/PT consult to assist with strengthening/mobility  - Encourage toileting schedule  Outcome: Progressing

## 2020-10-08 NOTE — PAYOR COMM NOTE
--------------  CONTINUED STAY REVIEW-----REQUESTING ADDITIONAL DAY 10/8      Payor: [id number] #:  [id number]  Authorization Number: [id number]    Admit date: 10/6/20  Admit time: 2143    Admitting Physician: Ronny Velez MD  Atte • Breast Cancer Neg     • Ovarian Cancer Neg     • DCIS Neg     • LCIS Neg     • BRCA gene + Neg     • Ashkenazi Sabianist Descent Neg        Social History:  Social History    Tobacco Use      Smoking status: Never Smoker      Smokeless tobacco: Never Used Neck: Normal range of motion. Cardiovascular: Normal rate, regular rhythm and normal heart sounds. Edema not present. Pulmonary/Chest: Effort normal and breath sounds normal. No respiratory distress. She has no rales. Abdominal: Soft.  Bowel sounds Ct Chest Pe Aorta (iv Only) (cpt=71260)     Result Date: 10/6/2020  CONCLUSION:   Respiratory motion limits evaluation of pulmonary arteries. No evidence of pulmonary embolism to the bilateral segmental arterial level.   Mild peripheral patchy ground-glass Patient will require inpatient services that will reasonably be expected to span two midnight's based on the clinical documentation in H+P. Based on patients current state of illness, I anticipate that, after discharge, patient will require TBD.           Plan:

## 2020-10-08 NOTE — PLAN OF CARE
Problem: Safety Risk - Non-Violent Restraints  Goal: Patient will remain free from self-harm  Description: INTERVENTIONS:  - Apply the least restrictive restraint to prevent harm  - Notify patient and family of reasons restraints applied  - Assess for an 10/8 at midnight. Patient refusing to eat meals and swallow meds crushed. Not able to verbalize clearly, alert and oriented x 0, Dr. Henri Mesa notified and aware. IV fluids maintained.

## 2020-10-08 NOTE — CONSULTS
[facility]    Report of Consultation    Marlene Moon Patient Status:  Inpatient    1950 MRN [id number]   Location [address] [facility] Attending Amna Moreno MD   Hosp Day # 1 PCP Cong Maloney MD     Date of Admission:  10/ unit for the last 2 years and she is currently on Namenda. The patient found sleeping in her room and limited interaction otherwise does not of during our communication demonstrating disorientation and disorganized speech and thought process.     CT scan Smokeless tobacco: Never Used    Alcohol use: No      Alcohol/week: 0.0 standard drinks    Drug use: No          Current Medications:    •  Heparin Sodium (Porcine) 5000 UNIT/ML injection 5,000 Units, 5,000 Units, Subcutaneous, Q8H [address]    •  potassium ch of Systems:     As by Admitting/Attending    Results:     Laboratory Data:  Lab Results   Component Value Date    WBC 8.0 10/07/2020    HGB 15.6 10/07/2020    HCT 48.5 (H) 10/07/2020    .0 10/07/2020    CREATSERUM 1.03 (H) 10/07/2020    BUN 46 (H) 1 months.     Dictated by (CST): Palma Feldman MD on 10/06/2020 at 8:05 PM     Finalized by (CST): Palma Feldman MD on 10/06/2020 at 8:14 PM            Vital Signs:     Blood pressure (!) 117/95, pulse 99, temperature 98.7 °F (37.1 °C), temperature so the team.  7.  Follow-up during this management    Orders This Visit:  Orders Placed This Encounter      CBC With Differential With Platelet      Basic Metabolic Panel (8)      Urinalysis with Culture Reflex STAT      Hepatic Function Panel (7)      Pro Be

## 2020-10-08 NOTE — PROGRESS NOTES
[facility]  Progress Note    Conor Roque Patient Status:  Inpatient    1950 MRN [id number]   Location [facility] Attending Marielle Mendez MD   Hosp Day # 2 PCP Adam Olson MD     Date:  10/8/2020  Date of Admission: other psychotic disorder. Not due to a substance or known physiological condition    •  AmLODIPine Besylate 5 MG Oral Tab, Take 5 mg by mouth daily.     •  SERTRALINE HCL 50 MG Oral Tab, TAKE 1 TABLET(50 MG) BY MOUTH DAILY    •  Cholecalciferol (VITAMIN D) Papanicolaou contraindicated    Results:     Lab Results   Component Value Date    WBC 6.1 10/08/2020    HGB 14.3 10/08/2020    HCT 44.8 10/08/2020    .0 10/08/2020    CREATSERUM 0.73 10/08/2020    BUN 27 (H) 10/08/2020     (H) 10/08/2020    K (CST): Joanne Reich MD on 10/06/2020 at 8:05 PM     Finalized by (CST): Joanne Reich MD on 10/06/2020 at 8:14 PM          Ekg 12-lead    Result Date: 10/6/2020  ECG Report  Interpretation  -------------------------- Sinus Tachycardia -Short OH sy

## 2020-10-09 NOTE — PROGRESS NOTES
Ryder Portillo is a 69year old  female with history of frontal lobe dementia and anxiety who has been tested positive for COVID 9/23/2020 brought to the hospital for increased deterioration in her function and increased agitation.    The patient (Active prior to today's visit):    •  haloperidol lactate (HALDOL) 5 MG/ML injection 1 mg, 1 mg, Intravenous, Q6H PRN    •  Heparin Sodium (Porcine) 5000 UNIT/ML injection 5,000 Units, 5,000 Units, Subcutaneous, Q8H [address]    •  potassium chloride 10 mEq in Finalized by (CST): Isis Jeffrey MD on 10/06/2020 at 4:29 PM          Ct Chest Pe Aorta (iv Only) (cpt=71260)    Result Date: 10/6/2020  CONCLUSION:   Respiratory motion limits evaluation of pulmonary arteries.   No evidence of pulmonary embolism to the Depacon to 50 mg IV twice daily. 4.  Change Haldol to 1 mg IV every 4 hours PRN for agitation and discontinue scheduled Haldol. .  5.  Coordinate treatment plan with the team.  6.  Follow-up during this management      Orders This Visit:  Debby Colon

## 2020-10-09 NOTE — PLAN OF CARE
Problem: Patient Centered Care  Goal: Patient preferences are identified and integrated in the patient's plan of care  Description: Interventions:  - What would you like us to know as we care for you? [facility]    - Provide timely, complete, a INTEGRITY - ADULT  Goal: Skin integrity remains intact  Description: INTERVENTIONS  - Assess and document risk factors for pressure ulcer development  - Assess and document skin integrity  - Monitor for areas of redness and/or skin breakdown  - Initiate in Progressing     Problem: DISCHARGE PLANNING  Goal: Discharge to home or other facility with appropriate resources  Description: INTERVENTIONS:  - Identify barriers to discharge w/pt and caregiver  - Include patient/family/discharge partner in discharge giana

## 2020-10-09 NOTE — PLAN OF CARE
Problem: Safety Risk - Non-Violent Restraints  Goal: Patient will remain free from self-harm  Description: INTERVENTIONS:  - Apply the least restrictive restraint to prevent harm  - Notify patient and family of reasons restraints applied  - Assess for an respiratory status  - Assess for changes in mentation and behavior  - Position to facilitate oxygenation and minimize respiratory effort  - Oxygen supplementation based on oxygen saturation or ABGs  - Provide Smoking Cessation handout, if applicable  - Enc thought processing and/or memory  Description: Interventions:  - Minimize distractions in the room when full attention is required  Outcome: Not Progressing     Problem: SAFETY ADULT - FALL  Goal: Free from fall injury  Description: INTERVENTIONS:  - Asses trial release assessment. Patient has adequate circulation, sensation, and movement to bilateral wrists. Maintains adequate hydration by intravenous intake. Patient is turned and repositioned every 2 hours. Prompt care is given to incontinence.  Kept clean

## 2020-10-09 NOTE — PROGRESS NOTES
[facility]  Progress Note    Redell Mooring Patient Status:  Inpatient    1950 MRN [id number]   Location [facility] Attending Susan Catherine MD   Hosp Day # 3 PCP Teri Cortez MD     Date:  10/8/2020  Date of Admission: other psychotic disorder. Not due to a substance or known physiological condition    •  AmLODIPine Besylate 5 MG Oral Tab, Take 5 mg by mouth daily.     •  SERTRALINE HCL 50 MG Oral Tab, TAKE 1 TABLET(50 MG) BY MOUTH DAILY    •  Cholecalciferol (VITAMIN D) contraindicated    Results:     Lab Results   Component Value Date    WBC 6.2 10/09/2020    HGB 14.7 10/09/2020    HCT 44.8 10/09/2020    .0 10/09/2020    CREATSERUM 0.67 10/09/2020    BUN 19 (H) 10/09/2020     (H) 10/09/2020    K 3.3 (L) 10/0 Nursing note and vitals reviewed. Constitutional: She appears well-developed and well-nourished. HENT:   Head: Normocephalic and atraumatic. Eyes: Pupils are equal, round, and reactive to light. Neck: Normal range of motion.    Gaby Nixon

## 2020-10-09 NOTE — CM/SW NOTE
KRISTAN follow up:  SW received vm from [employer]. Pt needs to be restraint free for at least 24 hrs prior to return to facility. Plan:  Once med clear, d/c back to [facility].     Casey Garrett

## 2020-10-10 NOTE — PLAN OF CARE
Problem: Safety Risk - Non-Violent Restraints  Goal: Patient will remain free from self-harm  Description: INTERVENTIONS:  - Apply the least restrictive restraint to prevent harm  - Notify patient and family of reasons restraints applied  - Assess for an respiratory status  - Assess for changes in mentation and behavior  - Position to facilitate oxygenation and minimize respiratory effort  - Oxygen supplementation based on oxygen saturation or ABGs  - Provide Smoking Cessation handout, if applicable  - Enc Interventions:    Outcome: Progressing     Problem: SAFETY ADULT - FALL  Goal: Free from fall injury  Description: INTERVENTIONS:  - Assess pt frequently for physical needs  - Identify cognitive and physical deficits and behaviors that affect risk of falls

## 2020-10-10 NOTE — SLP NOTE
ADULT SWALLOWING EVALUATION    ASSESSMENT    ASSESSMENT/OVERALL IMPRESSION:      PT IS COVID 19 +. PPE REQUIRED. THIS SLP WORE GLOVES, GOWN, FACE SHIELD AND DROPLET MASK OVER N95 MASK. HANDS SANITIZED/WASHED UPON ENTRANCE/EXIT.     This BSE was ordered d/t Swallowing precautions written on white board in Pt room. PLAN:    To f/u x4 sessions/meals for dysphagia treatment. Will ensure safe tolerance of diet and reinforce swallowing/aspiration precautions. Will monitor for new CXR results.  Diet to be u Phase of Swallow: Impaired  Bolus Retrieval: Impaired; anterior loss     Bolus Formation: Impaired  Bolus Propulsion: Impaired     Retention: Impaired    Pharyngeal Phase of Swallow: Impaired  Laryngeal Elevation Timing: Appears impaired  Laryngeal Elevati

## 2020-10-10 NOTE — PROGRESS NOTES
[facility]  [facility] Note    Haydee Kwong Patient Status:  Inpatient    1950 MRN [id number]   Location [address] [facility] Attending Jorden Turner MD   Hosp Day # 4 PCP Val Mcpherson MD     Date:  10/10/2020  Date of Admission other psychotic disorder. Not due to a substance or known physiological condition    •  AmLODIPine Besylate 5 MG Oral Tab, Take 5 mg by mouth daily.     •  SERTRALINE HCL 50 MG Oral Tab, TAKE 1 TABLET(50 MG) BY MOUTH DAILY    •  Cholecalciferol (VITAMIN D) at IV's. Moderate dysphagia per SLP evaluation. Skin: Skin is warm and dry. Psychiatric:   Unable to assess.      Cervical Papanicolaou contraindicated    Results:     Lab Results   Component Value Date    WBC 6.2 10/09/2020    HGB 14.7 10/09/2020    HC midnight's based on the clinical documentation in H+P. Based on patients current state of illness, I anticipate that, after discharge, patient will require TBD.         Skinny Marmolejo MD  10/10/2020

## 2020-10-10 NOTE — PLAN OF CARE
Problem: Safety Risk - Non-Violent Restraints  Goal: Patient will remain free from self-harm  Description: INTERVENTIONS:  - Apply the least restrictive restraint to prevent harm  - Notify patient and family of reasons restraints applied  - Assess for an confused  -Monitor VS  -Monitor airway  -Monitor for pain    Interventions:   -Medication revies  - See additional Care Plan goals for specific interventions  10/10/2020 0605 by Gita Verma RN  Outcome: Progressing  10/9/2020 2123 by Priyanka Correa, perfusion and minimize risk of hemorrhage  - Monitor temperature, glucose, and sodium.  Initiate appropriate interventions as ordered  10/10/2020 0605 by Abbie Garcia RN  Outcome: Progressing  10/9/2020 2123 by Abbie Garcia RN  Outcome: Prog partner in discharge planning  - Arrange for needed discharge resources and transportation as appropriate  - Identify discharge learning needs (meds, wound care, etc)  - Arrange for interpreters to assist at discharge as needed  - Consider post-discharge p

## 2020-10-11 PROBLEM — R13.12 OROPHARYNGEAL DYSPHAGIA: Status: ACTIVE | Noted: 2020-10-11

## 2020-10-11 PROCEDURE — 99232 SBSQ HOSP IP/OBS MODERATE 35: CPT | Performed by: OTHER

## 2020-10-11 RX ORDER — MEGESTROL ACETATE 40 MG/ML
400 SUSPENSION ORAL DAILY
Status: DISCONTINUED | OUTPATIENT
Start: 2020-10-11 | End: 2020-10-17

## 2020-10-11 NOTE — PROGRESS NOTES
[facility]  Progress Note    Hao Wade Patient Status:  Inpatient    1950 MRN [id number]   Location [facility] Attending Francine Davila MD   Hosp Day # 5 PCP Melanie San MD     Date:  10/11/2020  Date of Admission other psychotic disorder. Not due to a substance or known physiological condition    •  AmLODIPine Besylate 5 MG Oral Tab, Take 5 mg by mouth daily.     •  SERTRALINE HCL 50 MG Oral Tab, TAKE 1 TABLET(50 MG) BY MOUTH DAILY    •  Cholecalciferol (VITAMIN D) at IV's. Moderate dysphagia per SLP evaluation. Skin: Skin is warm and dry. Psychiatric:   Unable to assess.      Cervical Papanicolaou contraindicated    Results:     Lab Results   Component Value Date    WBC 6.2 10/09/2020    HGB 14.7 10/09/2020    HC services that will reasonably be expected to span two midnight's based on the clinical documentation in H+P. Based on patients current state of illness, I anticipate that, after discharge, patient will require TBD.         Damian Mathis MD  10/11/2020

## 2020-10-11 NOTE — PLAN OF CARE
Problem: Safety Risk - Non-Violent Restraints  Goal: Patient will remain free from self-harm  Description: INTERVENTIONS:  - Apply the least restrictive restraint to prevent harm  - Notify patient and family of reasons restraints applied  - Assess for an respiratory status  - Assess for changes in mentation and behavior  - Position to facilitate oxygenation and minimize respiratory effort  - Oxygen supplementation based on oxygen saturation or ABGs  - Provide Smoking Cessation handout, if applicable  - Enc appropriate  - Consider OT/PT consult to assist with strengthening/mobility  - Encourage toileting schedule  Outcome: Progressing     Vital signs are stable. Appears comfortable with no signs of pain.  Restraints reordered and assessment performed per flex

## 2020-10-12 NOTE — PAYOR COMM NOTE
--------------  CONTINUED STAY REVIEW-------REQUESTING ADDITIONAL DAY 10/11      Payor: [id number] #:  [id number]  Authorization Number: [id number]    Admit date: 10/6/20  Admit time: 2143    Admitting Physician: MD BARBARA Pimentel • Breast Cancer Neg     • Ovarian Cancer Neg     • DCIS Neg     • LCIS Neg     • BRCA gene + Neg     • Ashkenazi Shinto Descent Neg        Social History:  Social History    Tobacco Use      Smoking status: Never Smoker      Smokeless tobacco: Never Used Neck: Normal range of motion. Cardiovascular: Normal rate, regular rhythm and normal heart sounds. Edema not present. Pulmonary/Chest: Effort normal and breath sounds normal. No respiratory distress. She has no rales. Abdominal: Soft.  Bowel sounds Resume statin once oral intake resumed.       Low serum vitamin B12  B 12 level therapeutic.       Dementia with behavioral disturbance, unspecified dementia type ([facility] [address]  At baseline pt pleasant, confused and somewhat impulsive but easily redirectable.    10/11/2020 2208 Given 2.5238 mg Intramuscular (Right Deltoid) Anna Marie Parrish RN      potassium chloride 40 mEq in sodium chloride 0.9% 250 mL IVPB     Date Action Dose Route User    10/12/2020 1012 New Bag 40 mEq Intravenous PHIL Ren

## 2020-10-12 NOTE — PROGRESS NOTES
Pt currently on RA, SpO2 100%, afebrile. Continues with confusion, soft wrist restraints in place. Seen by Dr. Ye Lamar on 10/7 and started on Depakote IV and PRN haldol to help stabilize mood. On heparin q8h. Will continue to monitor.       [address]

## 2020-10-12 NOTE — PLAN OF CARE
Problem: Safety Risk - Non-Violent Restraints  Goal: Patient will remain free from self-harm  Description: INTERVENTIONS:  - Apply the least restrictive restraint to prevent harm  - Notify patient and family of reasons restraints applied  - Assess for an respiratory status  - Assess for changes in mentation and behavior  - Position to facilitate oxygenation and minimize respiratory effort  - Oxygen supplementation based on oxygen saturation or ABGs  - Provide Smoking Cessation handout, if applicable  - Enc

## 2020-10-12 NOTE — PROGRESS NOTES
[facility]  Progress Note    Corinna Spencer Patient Status:  Inpatient    1950 MRN [id number]   Location [address] [facility] Attending Farnaz Leija MD   Hosp Day # 6 PCP Vipin Peterson MD     Date:  10/12//2020  Date of Admissio other psychotic disorder. Not due to a substance or known physiological condition    •  AmLODIPine Besylate 5 MG Oral Tab, Take 5 mg by mouth daily.     •  SERTRALINE HCL 50 MG Oral Tab, TAKE 1 TABLET(50 MG) BY MOUTH DAILY    •  Cholecalciferol (VITAMIN D) tugging at IV's. Moderate dysphagia per SLP evaluation. Skin: Skin is warm and dry. Psychiatric:   Unable to assess.      Cervical Papanicolaou contraindicated    Results:     Lab Results   Component Value Date    WBC 4.6 10/12/2020    HGB 14.4 10/12/20 Certification of Need for Inpatient Hospitalization - Initial Certification    Patient will require inpatient services that will reasonably be expected to span two midnight's based on the clinical documentation in H+P.    Based on patients current state of

## 2020-10-12 NOTE — PROGRESS NOTES
Jaclyn Teague is a 69year old  female with history of frontal lobe dementia and anxiety who has been tested positive for COVID 9/23/2020 brought to the hospital for increased deterioration in her function and increased agitation.    The patient • LCIS Neg    • BRCA gene + Neg    • Ashkenazi Oriental orthodox Descent Neg       Social History: Social History    Tobacco Use      Smoking status: Never Smoker      Smokeless tobacco: Never Used    Alcohol use: No      Alcohol/week: 0.0 standard drinks    Drug u speech. Language is bizarre. Behavior is agitated. Mood with apathetic affect. Patient respond to internal stimuli and hallucination. Judgment and insight are impaired as evidenced by the patient behavior and significant cognitive impairment.        AS Prescriptions      No prescriptions requested or ordered in this encounter         10/11/2020  Anastasiia Martines MD

## 2020-10-13 RX ORDER — POTASSIUM CHLORIDE 20 MEQ/1
40 TABLET, EXTENDED RELEASE ORAL ONCE
Status: COMPLETED | OUTPATIENT
Start: 2020-10-13 | End: 2020-10-13

## 2020-10-13 RX ORDER — MELATONIN
100 DAILY
Status: DISCONTINUED | OUTPATIENT
Start: 2020-10-13 | End: 2020-10-13

## 2020-10-13 RX ORDER — RUFINAMIDE 40 MG/ML
1 SUSPENSION ORAL DAILY
Status: DISCONTINUED | OUTPATIENT
Start: 2020-10-13 | End: 2020-10-13

## 2020-10-13 NOTE — PROGRESS NOTES
Pt currently on RA, SpO2 98%, afebrile. Continues with confusion, soft wrist restraints in place. Refusing breakfast  Seen by Dr. Katty Doe on 10/7 and started on Depakote IV and PRN haldol to help stabilize mood. On heparin q8h.    Will continue to mo

## 2020-10-13 NOTE — PLAN OF CARE
Phone call made to Dr. Funmi Ruiz to inform that patient was refusing to eat her meals by turning her head away, and spitting the food out her mouth. Dr. Funmi Ruiz reported that he will be in to see the patient for any other further orders.

## 2020-10-13 NOTE — PAYOR COMM NOTE
--------------CONTINUED STAY REVIEW-------REQUESTING ADDITIONAL DAY 10/12     Payor: [address] [place] [address] #:  [id number]  Authorization Number: [id number]    Admit date: 10/6/20  Admit time: 2143    Admitting Physician: Rachell Bloch, MD  Atte • Breast Cancer Neg     • Ovarian Cancer Neg     • DCIS Neg     • LCIS Neg     • BRCA gene + Neg     • Ashkenazi Buddhist Descent Neg        Social History:  Social History    Tobacco Use      Smoking status: Never Smoker      Smokeless tobacco: Never Used Neck: Normal range of motion. Cardiovascular: Normal rate, regular rhythm and normal heart sounds. Edema not present. Pulmonary/Chest: Effort normal and breath sounds normal. No respiratory distress. She has no rales. Abdominal: Soft.  Bowel sounds TSH mildly elevated and T4 normal. Resume oral replacement when able.       Hyperlipidemia  Resume statin once oral intake resumed.       Low serum vitamin B12  B 12 level therapeutic.       Dementia with behavioral disturbance, unspecified dementia type ( 10/12/2020 2209 Given 2.5238 mg Intramuscular (Left Deltoid) Dequan Rucker RN      Potassium Chloride ER (K-DUR M20) CR tab 40 mEq     Date Action Dose Route User    10/13/2020 0924 Given 40 mEq Oral Rommel Saul RN      Thiamine HCl (Vitamin B-1)

## 2020-10-13 NOTE — PLAN OF CARE
Problem: Safety Risk - Non-Violent Restraints  Goal: Patient will remain free from self-harm  Description: INTERVENTIONS:  - Apply the least restrictive restraint to prevent harm  - Notify patient and family of reasons restraints applied  - Assess for an respiratory status  - Assess for changes in mentation and behavior  - Position to facilitate oxygenation and minimize respiratory effort  - Oxygen supplementation based on oxygen saturation or ABGs  - Provide Smoking Cessation handout, if applicable  - Enc devices as appropriate  - Consider OT/PT consult to assist with strengthening/mobility  - Encourage toileting schedule  Outcome: Progressing    Patient confused/agitated. Fall precautions in place, call light within patient reach.  Bilateral soft wrist rest

## 2020-10-13 NOTE — DIETARY NOTE
ADULT NUTRITION REASSESSMENT     Pt is at high nutrition risk. Pt meets severe malnutrition criteria.     CRITERIA FOR MALNUTRITION DIAGNOSIS:  Criteria for severe malnutrition diagnosis: acute illness/injury related to wt loss greater than 5% in 1 month a Dx on 9/23. Noted decline in intake ~1 wk PTA despite encouragement.  Per RN, pt refusing to take PO this AM.      NUTRITION INTERVENTION:  - Nutrition Prescription:  · Estimated Nutritional Needs: Dosing wt (current wt 10/8) = 57.6 kg  · Calories: 0095-548 CLASSIFICATION: less than 23 kg/m2 - underweight for advanced age  IBW: 120 lbs        106% IBW  Usual Body Wt: 128-132 lbs       99% UBW    WEIGHT HISTORY: Most recent wt per NH record 146.2 lb on 9/9 which reflects 13% (19 lb) wt loss over the past ~1 mo consistently taking PO meds; off IV fluids since 10/10; on-going PO replacement for low-WNL K with KCl  • Megestrol Acetate  400 mg Oral Daily   • OLANZapine (ZYPREXA) IM injection  2.5238 mg Intramuscular Nightly   • Heparin Sodium (Porcine)  5,000 Units follow.       [address] [facility] [address], [address] [place] [address], [facility]

## 2020-10-13 NOTE — PLAN OF CARE
Problem: Safety Risk - Non-Violent Restraints  Goal: Patient will remain free from self-harm  Description: INTERVENTIONS:  - Apply the least restrictive restraint to prevent harm  - Notify patient and family of reasons restraints applied  - Assess for an Problem: SKIN/TISSUE INTEGRITY - ADULT  Goal: Skin integrity remains intact  Description: INTERVENTIONS  - Assess and document risk factors for pressure ulcer development  - Assess and document skin integrity  - Monitor for areas of redness and/or skin b assistive devices as appropriate  - Consider OT/PT consult to assist with strengthening/mobility  - Encourage toileting schedule  Outcome: Progressing     Problem: DISCHARGE PLANNING  Goal: Discharge to home or other facility with appropriate resources  Hal Matt

## 2020-10-13 NOTE — SLP NOTE
SPEECH DAILY NOTE - INPATIENT    ASSESSMENT & PLAN   ASSESSMENT  PT COVID-19 POSITIVE. CONTACT AND DROPLET ISOLATION PPE REQUIRED. THIS THERAPIST WORE GOWN, GLOVES, FACE SHIELD, AND DROPLET/N95 RESPIRATOR MASK. HANDS SANITIZED/WASHED UPON ENTRANCE/EXIT. nodule. Advise correlation with PET-CT when clinically appropriate. Additional 0.5 cm right lower lobe pulmonary nodule. Consider follow-up chest CT in 6-12 months.            Dictated by (CST): James Sheth MD on 10/06/2020 at 8:05 PM       Iesha symptoms of aspiration with 100 % accuracy over 2 session(s). Upgrade trials of thins via tsp administered. Pt eventually accepted 1/4 tsp with anterior spillage beyond the level of the chin. Trials not appropriate at this time and d/c.  Continue to assess

## 2020-10-13 NOTE — PROGRESS NOTES
[facility]  Progress Note    Garcia Natarajan Patient Status:  Inpatient    1950 MRN [id number]   Location [address] [facility] Attending Matthew Vargas MD   Hosp Day # 7 PCP Jodi Hansen MD     Date:  10/13//2020  Date of Admissio other psychotic disorder. Not due to a substance or known physiological condition    •  AmLODIPine Besylate 5 MG Oral Tab, Take 5 mg by mouth daily.     •  SERTRALINE HCL 50 MG Oral Tab, TAKE 1 TABLET(50 MG) BY MOUTH DAILY    •  Cholecalciferol (VITAMIN D) IV's. Moderate dysphagia per SLP evaluation. Skin: Skin is warm and dry. Psychiatric:   Unable to assess.      Cervical Papanicolaou contraindicated    Results:     Lab Results   Component Value Date    WBC 4.6 10/12/2020    HGB 14.4 10/12/2020    HCT 4 ordered and appears euuvolemic. Hypernatremia  Switched to 0.2 NS due to free water deficits. 10/10: Na+ has normalized - will DC IV's.         **Certification      PHYSICIAN Certification of Need for Inpatient Hospitalization - Initial Certification

## 2020-10-14 ENCOUNTER — APPOINTMENT (OUTPATIENT)
Dept: GENERAL RADIOLOGY | Facility: HOSPITAL | Age: 70
DRG: 177 | End: 2020-10-14
Attending: INTERNAL MEDICINE
Payer: MEDICARE

## 2020-10-14 PROCEDURE — 99232 SBSQ HOSP IP/OBS MODERATE 35: CPT | Performed by: OTHER

## 2020-10-14 PROCEDURE — 71045 X-RAY EXAM CHEST 1 VIEW: CPT | Performed by: INTERNAL MEDICINE

## 2020-10-14 NOTE — PLAN OF CARE
Problem: Safety Risk - Non-Violent Restraints  Goal: Patient will remain free from self-harm  Description: INTERVENTIONS:  - Apply the least restrictive restraint to prevent harm  - Notify patient and family of reasons restraints applied  - Assess for an retention  Outcome: Progressing     Problem: SKIN/TISSUE INTEGRITY - ADULT  Goal: Skin integrity remains intact  Description: INTERVENTIONS  - Assess and document risk factors for pressure ulcer development  - Assess and document skin integrity  - Monitor assessment  - Modify environment to reduce risk of injury  - Provide assistive devices as appropriate  - Consider OT/PT consult to assist with strengthening/mobility  - Encourage toileting schedule  Outcome: Progressing     Problem: [address] [place] taken and stable. Contact and droplet isolation precautions are followed at all times. Dobhoff tube inserted per doctor orders to right night. Chest xray will confirm placement of feeding tube.  Dietician on consult for tube feedings after verification of p

## 2020-10-14 NOTE — PROGRESS NOTES
Prashant Ambrocio is a 69year old  female with history of frontal lobe dementia and anxiety who has been tested positive for COVID 9/23/2020 brought to the hospital for increased deterioration in her function and increased agitation.    The patient Neg    • LCIS Neg    • BRCA gene + Neg    • Ashkenazi Amish Descent Neg       Social History: Social History    Tobacco Use      Smoking status: Never Smoker      Smokeless tobacco: Never Used    Alcohol use: No      Alcohol/week: 0.0 standard drinks    D Imaging  Xr Chest Ap Portable  (cpt=71045)    Result Date: 10/14/2020  CONCLUSION:   Post placement of a feeding tube with tip at the level of the gastric pylorus  Mild interstitial edema, unchanged.    Dictated by (CST): Cliff Read MD on 10/14/2020 a Differential With Platelet      Basic Metabolic Panel (8)      Urinalysis with Culture Reflex STAT      Hepatic Function Panel (7)      Pro Beta Natriuretic Peptide      LDH      C-Reactive Protein      Ferritin      Troponin I      Troponin I      Procalc

## 2020-10-14 NOTE — PROGRESS NOTES
Pt covid + 9/23. Currently on RA, SpO2 100%, afebrile. STable from covid standpoint at this time. Continues with confusion and agitation, soft wrist restraints in place. Refusing to eat per RN, Dr. Margarita Molina aware and vitamin replacement ordered.  Possibly st

## 2020-10-14 NOTE — PROGRESS NOTES
[facility]  Progress Note    Maria Antonia Avila Patient Status:  Inpatient    1950 MRN [id number]   Location [address] [facility] Attending Rachell Bloch, MD   Hosp Day # 8 PCP Minh Francis MD     Date:  10/14//2020  Date of Admissio other psychotic disorder. Not due to a substance or known physiological condition    •  AmLODIPine Besylate 5 MG Oral Tab, Take 5 mg by mouth daily.     •  SERTRALINE HCL 50 MG Oral Tab, TAKE 1 TABLET(50 MG) BY MOUTH DAILY    •  Cholecalciferol (VITAMIN D) quite impulsive and tugging at IV's. Moderate dysphagia per SLP evaluation. Skin: Skin is warm and dry. Psychiatric:   Unable to assess.      Cervical Papanicolaou contraindicated    Results:     Lab Results   Component Value Date    WBC 4.6 10/12/2020 easily redirectable. COVID-19 virus infection  Continue to monitor markers and SpO2. Dehydration  IV fluids ordered and appears euuvolemic. Hypernatremia  Switched to 0.2 NS due to free water deficits.  10/10: Na+ has normalized - will DC IV'

## 2020-10-14 NOTE — PLAN OF CARE
Problem: Safety Risk - Non-Violent Restraints  Goal: Patient will remain free from self-harm  Description: INTERVENTIONS:  - Apply the least restrictive restraint to prevent harm  - Notify patient and family of reasons restraints applied  - Assess for an respiratory status  - Assess for changes in mentation and behavior  - Position to facilitate oxygenation and minimize respiratory effort  - Oxygen supplementation based on oxygen saturation or ABGs  - Provide Smoking Cessation handout, if applicable  - Enc call light within patient reach. On room air. On remote tele, no calls. Purewick in place. IVF infusing. Will continue to monitor. Patient refused to drink anything and is not cooperative with oral care.

## 2020-10-14 NOTE — PAYOR COMM NOTE
--------------  CONTINUED STAY REVIEW------REQUESTING ADDITIONAL DAY 10/13      Payor: [id number] [place] [address] #:  [id number]  Authorization Number: [id number]    Admit date: 10/6/20  Admit time: 2143    Admitting Physician: Farnaz Leija MD  At • Breast Cancer Neg     • Ovarian Cancer Neg     • DCIS Neg     • LCIS Neg     • BRCA gene + Neg     • Ashkenazi Anglican Descent Neg        Social History:  Social History    Tobacco Use      Smoking status: Never Smoker      Smokeless tobacco: Never Used Neck: Normal range of motion. Cardiovascular: Normal rate, regular rhythm and normal heart sounds. Edema not present. Pulmonary/Chest: Effort normal and breath sounds normal. No respiratory distress. She has no rales. Abdominal: Soft.  Bowel sounds Severe protein calorie malnutrition  Due to persistent refusal of food and fluid intake. Will start enteral feedings tomorrow if no improvement.  Labs and vitamin replacement ordered.       Hypothyroid  TSH mildly elevated and T4 normal. Resume oral rep Megestrol Acetate (MEGACE) 40 MG/ML oral suspension 400 mg     Date Action Dose Route User    10/14/2020 0814 Given 400 mg Oral Starla Rebolledo RN      Infuvite Adult (INFUVITE) 10 mL, Thiamine HCl 961 mg, folic acid (FOLVITE) 1 mg in dextrose 5 % 1,000

## 2020-10-15 RX ORDER — POTASSIUM CHLORIDE 14.9 MG/ML
20 INJECTION INTRAVENOUS ONCE
Status: COMPLETED | OUTPATIENT
Start: 2020-10-15 | End: 2020-10-15

## 2020-10-15 NOTE — SLP NOTE
SPEECH DAILY NOTE - INPATIENT    ASSESSMENT & PLAN   ASSESSMENT      PPE REQUIRED. THIS SLP WORE GLOVES AND DROPLET MASK. HANDS SANITIZED/WASHED UPON ENTRANCE/EXIT.       Pt fairly alert, afebrile and on room air with N-G tube present as well as wrist restr consistencies  Aspiration Precautions: Upright position; Slow rate; No straw  Medication Administration Recommendations: Crushed in puree    Patient Experiencing Pain: No                Discharge Recommendations  Discharge Recommendations/Plan: Undetermined

## 2020-10-15 NOTE — PLAN OF CARE
Problem: Safety Risk - Non-Violent Restraints  Goal: Patient will remain free from self-harm  Description: INTERVENTIONS:  - Apply the least restrictive restraint to prevent harm  - Notify patient and family of reasons restraints applied  - Assess for an Update / inform patient on plan of care  - See additional Care Plan goals for specific interventions  Outcome: Progressing     Problem: RESPIRATORY - ADULT  Goal: Achieves optimal ventilation and oxygenation  Description: INTERVENTIONS:  - Assess for peck gait  - Educate and engage patient/family in tolerated activity level and precautions  - Recommend use of total lift for transfers  - Recommend use of chair position in bed 3 times per day  Outcome: Progressing     Problem: Impaired Cognition  Goal: Khari complex needs related to functional status, cognitive ability or social support system  Outcome: Progressing     Pt VSS. Receiving continuous Tube feedings.  Confused/ agitated/ combative; attempting to pull out equipment (tube feedings, IV line and access,

## 2020-10-15 NOTE — DIETARY NOTE
ADULT NUTRITION REASSESSMENT     Pt is at high nutrition risk. Pt meets severe malnutrition criteria.     CRITERIA FOR MALNUTRITION DIAGNOSIS:  Criteria for severe malnutrition diagnosis: acute illness/injury related to wt loss greater than 5% in 1 month a NH contact reports pt could be a picky eater at times but usually consumed 75-80% of meal trays prior to COVID Dx on 9/23. Noted decline in intake ~1 wk PTA despite encouragement.  Per RN, pt refusing to take PO this AM.    10/15/20 update: NGT placed and E recommendations to RN via secure chat  - Discharge and transfer of nutrition care to new setting or provider: monitor plans    ADMITTING DIAGNOSIS:   Dehydration [E86.0]  Hypernatremia [E87.0]  Acute encephalopathy [G93.40]  Dementia with behavioral distur gain over the past 1 year per NH records  Intake: 0% PO - Continues to refuse foods, drinks and meds  Percent Meals Eaten (last 3 days)     Date/Time Percent Meals Eaten (%)    10/13/20 0900  0 %    Percent Meals Eaten (%): refusing to eat, turning head aw --  2.6   OSMOCALC 287  --  286 285     • dextrose       NUTRITION RELATED PHYSICAL FINDINGS:  - Body Fat/Muscle Mass: Pt remains on contact isolation for COVID 19.  Unable to conduct face to face interview or NFPE with pt d/t limited contact restrictions d

## 2020-10-15 NOTE — PLAN OF CARE
Problem: Patient Centered Care  Goal: Patient preferences are identified and integrated in the patient's plan of care  Description: Interventions:  - What would you like us to know as we care for you? [facility]    - Provide timely, complete, a needs post-hospital services based on physician/LIP order or complex needs related to functional status, cognitive ability or social support system  Outcome: Progressing     Problem: Safety Risk - Non-Violent Restraints  Goal: Patient will remain free from supplementation based on oxygen saturation or ABGs  - Provide Smoking Cessation handout, if applicable  - Encourage broncho-pulmonary hygiene including cough, deep breathe, Incentive Spirometry  - Assess the need for suctioning and perform as needed  - Ass Skin intact and repositioned frequently.

## 2020-10-15 NOTE — PROGRESS NOTES
Pt currently on RA, SpO2 100%, afebrile. Continues to be stable from a covid standpoint at this time. Soft wrist restraints in place for continued confusion and agitation. Dobhoff in place with continuous tube feed. On heparin q8h.  Will continue to monitor

## 2020-10-15 NOTE — PROGRESS NOTES
[facility]  Progress Note    Elsa Doll Patient Status:  Inpatient    1950 MRN [id number]   Location [address] [facility] Attending Zoila Medina MD   [address] Day # 9 PCP Brisa Kang MD     Date:  10/15//2020  Date of Admissio other psychotic disorder. Not due to a substance or known physiological condition    •  AmLODIPine Besylate 5 MG Oral Tab, Take 5 mg by mouth daily.     •  SERTRALINE HCL 50 MG Oral Tab, TAKE 1 TABLET(50 MG) BY MOUTH DAILY    •  Cholecalciferol (VITAMIN D) quite impulsive and tugging at IV's. Moderate dysphagia per SLP evaluation. Skin: Skin is warm and dry. Psychiatric:   Unable to assess.      Cervical Papanicolaou contraindicated    Results:     Lab Results   Component Value Date    WBC 4.6 10/12/2020 NGTF's well but still requiring soft restraints to avert tube dislodgement. Hypothyroid  TSH mildly elevated and T4 normal. Resume oral replacement when able. Hyperlipidemia  Resume statin once oral intake resumed.       Low serum vitamin B12  B 1

## 2020-10-16 RX ORDER — LEVOTHYROXINE SODIUM ANHYDROUS 100 UG/5ML
25 INJECTION, POWDER, LYOPHILIZED, FOR SOLUTION INTRAVENOUS DAILY
Status: DISCONTINUED | OUTPATIENT
Start: 2020-10-16 | End: 2020-10-16

## 2020-10-16 NOTE — PROGRESS NOTES
Askdonnie[facility] 1 Note  IV Levothyroxine Dosing Protocol       Levothyroxine 25 mcg IV q24h was ordered by Dr David Lan. This patient meets criteiria to temporarily hold IV levothyroxine per P&T approved protocol.    IV Levothyroxine has

## 2020-10-16 NOTE — PROGRESS NOTES
[facility]  Progress Note    Jaclyn Teague Patient Status:  Inpatient    1950 MRN [id number]   Location [address] [facility] Attending Randi Nance MD   Hosp Day # 10 PCP Dov Baez MD     Date:  10/15//2020  Date of Admissi other psychotic disorder. Not due to a substance or known physiological condition    •  AmLODIPine Besylate 5 MG Oral Tab, Take 5 mg by mouth daily.     •  SERTRALINE HCL 50 MG Oral Tab, TAKE 1 TABLET(50 MG) BY MOUTH DAILY    •  Cholecalciferol (VITAMIN D) quite impulsive and tugging at IV's. Moderate dysphagia per SLP evaluation. Skin: Skin is warm and dry. Psychiatric:   Unable to assess.      Cervical Papanicolaou contraindicated    Results:     Lab Results   Component Value Date    WBC 4.6 10/12/2020 well but still requiring soft restraints to avert tube dislodgement. Hypothyroid  TSH mildly elevated and T4 normal. Resume oral replacement when able. IV levothyroxine ordered since hasn't taken oral meds for > 1 week.       Hyperlipidemia  Resume sta 10/10: sensorium has cleared and agitation resolved. Still quite impulsive and tugging at IV's. Moderate dysphagia per SLP evaluation. Skin: Skin is warm and dry. Psychiatric:   Unable to assess.

## 2020-10-16 NOTE — PROGRESS NOTES
Pt covid + 9/23. Discussed with Dr. Mathieu Garcia and hayden to dc isolation since 23 days from date of diagnosis.     Baldo Carreno, APRN  10/16/2020

## 2020-10-16 NOTE — PLAN OF CARE
VSS. On room air. TPN Continued. REstraints in place. Frequent repositioning.    Problem: Safety Risk - Non-Violent Restraints  Goal: Patient will remain free from self-harm  Description: INTERVENTIONS:  - Apply the least restrictive restraint to prevent ha and instruct to report SOB or any respiratory difficulty  - Respiratory Therapy support as indicated  - Manage/alleviate anxiety  - Monitor for signs/symptoms of CO2 retention  Outcome: Progressing     Problem: SKIN/TISSUE INTEGRITY - ADULT  Goal: Skin int

## 2020-10-17 RX ORDER — MEGESTROL ACETATE 40 MG/ML
400 SUSPENSION ORAL DAILY
Status: DISCONTINUED | OUTPATIENT
Start: 2020-10-18 | End: 2020-10-19

## 2020-10-17 NOTE — PLAN OF CARE
Problem: Safety Risk - Non-Violent Restraints  Goal: Patient will remain free from self-harm  Description: INTERVENTIONS:  - Apply the least restrictive restraint to prevent harm  - Notify patient and family of reasons restraints applied  - Assess for an Update / inform patient on plan of care  - See additional Care Plan goals for specific interventions  Outcome: Progressing     Problem: RESPIRATORY - ADULT  Goal: Achieves optimal ventilation and oxygenation  Description: INTERVENTIONS:  - Assess for peck risk of falls.   - [place] fall precautions as indicated by assessment.  - Educate pt/family on patient safety including physical limitations  - Instruct pt to call for assistance with activity based on assessment  - Modify environment to reduce risk of i restraints due to attempting to pull out lines. When restraints removed during pt care, pt quickly attempting to pull out Dobhoff. No signs of injury related to restraint use. Turned and repositioned every 2 hours.  Purwick in place, assessed with each repo

## 2020-10-17 NOTE — PROGRESS NOTES
[facility]  [facility]    Corinna Spencer Patient Status:  Inpatient    1950 MRN [id number]   Location [address] [facility] Attending Farnaz Leija MD   [facility] Day # 11 PCP Vipin Peterson MD     Date:  10/17//2020  Date of Admissi other psychotic disorder. Not due to a substance or known physiological condition    •  AmLODIPine Besylate 5 MG Oral Tab, Take 5 mg by mouth daily.     •  SERTRALINE HCL 50 MG Oral Tab, TAKE 1 TABLET(50 MG) BY MOUTH DAILY    •  Cholecalciferol (VITAMIN D) quite impulsive and tugging at IV's. Moderate dysphagia per SLP evaluation. Skin: Skin is warm and dry. Psychiatric:   Unable to assess.      Cervical Papanicolaou contraindicated    Results:     Lab Results   Component Value Date    WBC 4.6 10/12/2020 B12  B 12 level therapeutic. Dementia with behavioral disturbance, unspecified dementia type ([facility] [address]  At baseline pt pleasant, confused and somewhat impulsive but easily redirectable. COVID-19 virus infection  Continue to monitor markers and SpO2.

## 2020-10-18 NOTE — PLAN OF CARE
Pastoral care notified of PT's CARINA Pelayo's wishes for last rights by  before pt is discharged.  will call local  on Rickey 10/18.

## 2020-10-18 NOTE — PROGRESS NOTES
Pt transferred off of [facility] Unit to room 529. Report given to PHIL Mcglil. Pt stable upon transfer. Dobhoff in place, pt still tolerating tube feed. Pt in bilateral soft wrist restraints at handover, no signs of injury.  Pt emergency contact #1, Lakeisha notified

## 2020-10-18 NOTE — PROGRESS NOTES
[facility]OSP - [facility]  Progress Note    Lukasz Carson Patient Status:  Inpatient    1950 MRN [id number]   Location [address] [facility] Attending Kyrie Young MD   Hosp Day # 12 PCP Judit Gonzalez MD     Date:  10/17//2020  Date of Admissi other psychotic disorder. Not due to a substance or known physiological condition    •  AmLODIPine Besylate 5 MG Oral Tab, Take 5 mg by mouth daily.     •  SERTRALINE HCL 50 MG Oral Tab, TAKE 1 TABLET(50 MG) BY MOUTH DAILY    •  Cholecalciferol (VITAMIN D) quite impulsive and tugging at IV's. Moderate dysphagia per SLP evaluation. Skin: Skin is warm and dry. Psychiatric:   Unable to assess.      Cervical Papanicolaou contraindicated    Results:     Lab Results   Component Value Date    WBC 4.6 10/12/2020 B12  B 12 level therapeutic. Dementia with behavioral disturbance, unspecified dementia type ([facility] [address]  At baseline pt pleasant, confused and somewhat impulsive but easily redirectable. COVID-19 virus infection  Continue to monitor markers and SpO2.

## 2020-10-18 NOTE — PLAN OF CARE
Problem: Safety Risk - Non-Violent Restraints  Goal: Patient will remain free from self-harm  Description: INTERVENTIONS:  - Apply the least restrictive restraint to prevent harm  - Notify patient and family of reasons restraints applied  - Assess for an precautions  - Assist with ADLs  - Update / inform patient on plan of care  - See additional Care Plan goals for specific interventions  Outcome: Adequate for Discharge  Goal: Patient/Family Short Term Goal  Description: Patient's Short Term Goal: lucho, pt pressure and fluid volume within ordered parameters to optimize cerebral perfusion and minimize risk of hemorrhage  - Monitor temperature, glucose, and sodium.  Initiate appropriate interventions as ordered  Outcome: Adequate for Discharge     Problem: Impa preferences of patient/family/discharge partner  - Complete POLST form as appropriate  - Assess patient's ability to be responsible for managing their own health  - Refer to Case Management Department for coordinating discharge planning if the patient need

## 2020-10-18 NOTE — PLAN OF CARE
Problem: Safety Risk - Non-Violent Restraints  Goal: Patient will remain free from self-harm  Description: INTERVENTIONS:  - Apply the least restrictive restraint to prevent harm  - Notify patient and family of reasons restraints applied  - Assess for an signs  - Monitor labs  - Monitor airway  - Monitor for pain  - Fall precautions  - Aspiration precautions  - Assist with ADLs  - Update / inform patient on plan of care  - See additional Care Plan goals for specific interventions  Outcome: Progressing INTERVENTIONS:  - Assess pt frequently for physical needs  - Identify cognitive and physical deficits and behaviors that affect risk of falls.   - [place] fall precautions as indicated by assessment.  - Educate pt/family on patient safety including physic alert but remains confused. VSS. Pt tolerating tube feeds. Gonsalo Oh in place for incontinence. Pt turned and repositioned every 2 hours. Restraints in place as ordered. Assessed every 2 hours, no signs of injury.  When restraints are removed during pt care,

## 2020-10-19 NOTE — CM/SW NOTE
Interdisciplinary Rounds: 10/19/20  Admitted: 10/6/2020 LOS: 13  Disciplines in attendance: charge nurse, staff nurse, CM, [address] and discharge plan reviewed. Active issues needing resolution prior to discharge:     Pt is LTC resident at [facility].

## 2020-10-19 NOTE — PAYOR COMM NOTE
--------------  CONTINUED STAY REVIEW    Payor: [id number] #:  [id number]  Authorization Number: [id number]    Admit date: 10/6/20  Admit time: 2143  Requesting extension    Admitting Physician: Francine Davila MD  Attending Physician File    Prescriptions Prior to Admission      •  Acidophilus/Pectin Oral Cap, Take 1 capsule by mouth daily.     •  QUEtiapine Fumarate 25 MG Oral Tab, Take 25 mg by mouth daily. By mouth one time a day related to other psychotic disorder.  Not due to a sub Normal range of motion. Neurological:   Delirious and largely nonverbal. Appears restless and requiring restraints to avert dislodgement of IV's. Moving all extremities. 10/10: sensorium has cleared and agitation resolved.  Still quite impulsive and tuggi avert tube dislodgement.       Hypothyroid  TSH mildly elevated and T4 normal. Resume oral replacement when able.  IV levothyroxine ordered since hasn't taken oral meds for > 1 week.       Hyperlipidemia  Resume statin once oral intake resumed.       Low se involvement with normal PCT.     History           Past Medical History:   Diagnosis Date   • Alzheimer disease ([facility] [address]     • Anxiety state, unspecified              Past Surgical History:   Procedure Laterality Date   • CARPAL TUNNEL RELEASE       • FOOT ARNOLD BEFORE BREAKFAST     •  Vitamin C 500 MG Oral Tab, Take 500 mg by mouth daily.     •  MEMANTINE HCL 10 MG Oral Tab, TAKE 1 TABLET BY MOUTH TWICE DAILY     •  simvastatin 20 MG Oral Tab, TAKE 1 TABLET BY MOUTH NIGHTLY     •  Calcium 500-125 MG-UNIT Oral Tab 24.0 10/16/2020     GLU 99 10/16/2020     CA 9.1 10/16/2020     ALB 2.9 (L) 10/13/2020     ALKPHO 91 10/12/2020     BILT 0.7 10/12/2020     TP 6.2 (L) 10/12/2020      (H) 10/12/2020      (H) 10/12/2020     T4F 1.4 10/08/2020     TSH 5.430 (H) DC IV's.     10/17: D/w POA re lack of progress toward recovery from encephalopathy superimposed on late stage FT dementia. We agree that a G-tube would likely not add measurably to pt's quality of life nor meaningfully extend her lifespan.  She will d/w wi impulsive but easily redirectable.       COVID-19 virus infection  Continue to monitor markers and SpO2.       Dehydration  IV fluids ordered and appears euuvolemic.       Hypernatremia  Switched to 0.2 NS due to free water deficits.  10/10: Na+ has normali 9317 New Bag 250 mg Intravenous Yesica Rodriguez RN    10/19/2020 0235 New Bag 250 mg Intravenous Ayden Cruz RN    10/18/2020 1717 New Bag 250 mg Intravenous Shemar Newell RN        Date/Time Temp Pulse Resp BP SpO2 Weight O2 Device O2 Flow Rate

## 2020-10-19 NOTE — PROGRESS NOTES
[facility]  Progress Note    Gerri Majano Patient Status:  Inpatient    1950 MRN [id number]   Location [address] [facility] Attending David Zuñiga MD   [facility] Day # 15 PCP Mike Doll MD     Date:  10/19//2020  Date of Admissi other psychotic disorder. Not due to a substance or known physiological condition    •  AmLODIPine Besylate 5 MG Oral Tab, Take 5 mg by mouth daily.     •  SERTRALINE HCL 50 MG Oral Tab, TAKE 1 TABLET(50 MG) BY MOUTH DAILY    •  Cholecalciferol (VITAMIN D) quite impulsive and tugging at IV's. Moderate dysphagia per SLP evaluation. Skin: Skin is warm and dry. Psychiatric:   Unable to assess.      Cervical Papanicolaou contraindicated    Results:     Lab Results   Component Value Date    WBC 9.3 10/19/2020 12 level therapeutic. Dementia with behavioral disturbance, unspecified dementia type ([facility] [address]  At baseline pt pleasant, confused and somewhat impulsive but easily redirectable. COVID-19 virus infection  Continue to monitor markers and SpO2.       Paris Harvey

## 2020-10-19 NOTE — CM/SW NOTE
Addendum 105:    Per Giulia Guidry RN it is inappropriate to do a test within 90 days of a positive result. No need for a test from [facility] and they will take her back, even if it isn't today. SW uploaded clinical updates to [facility] via [employer].   SW request

## 2020-10-19 NOTE — PLAN OF CARE
Pt very confused and constantly tries to wiggle herself down in the bed to reach for the NG tube. Soft restraints in place for safety. VSS.  Will ctm   Problem: Patient Centered Care  Goal: Patient preferences are identified and integrated in the patient's Cessation handout, if applicable  - Encourage broncho-pulmonary hygiene including cough, deep breathe, Incentive Spirometry  - Assess the need for suctioning and perform as needed  - Assess and instruct to report SOB or any respiratory difficulty  - Respir appropriate resources  Description: INTERVENTIONS:  - Identify barriers to discharge w/pt and caregiver  - Include patient/family/discharge partner in discharge planning  - Arrange for needed discharge resources and transportation as appropriate  - Identif

## 2020-10-19 NOTE — PLAN OF CARE
Problem: Patient Centered Care  Goal: Patient preferences are identified and integrated in the patient's plan of care  Description: Interventions:  - What would you like us to know as we care for you? [facility]    - Provide timely, complete, a

## 2020-10-19 NOTE — DIETARY NOTE
ADULT NUTRITION REASSESSMENT     Pt is at high nutrition risk. Pt meets severe malnutrition criteria.     CRITERIA FOR MALNUTRITION DIAGNOSIS:  Criteria for severe malnutrition diagnosis: acute illness/injury related to wt loss greater than 5% in 1 month a more complete wt/diet history. NH contact reports pt could be a picky eater at times but usually consumed 75-80% of meal trays prior to COVID Dx on 9/23. Noted decline in intake ~1 wk PTA despite encouragement.  Per RN, pt refusing to take PO this AM.    10 other providers- communicated recommendations to RN via secure chat  - Discharge and transfer of nutrition care to new setting or provider: monitor plans - goals of care discussions on-going    ADMITTING DIAGNOSIS:   Dehydration [E86.0]  Hypernatremia [E87 noted 11% wt gain over the past 1 year per NH records  Intake: 0% PO - Continues to refuse foods, drinks and meds  Percent Meals Eaten (last 3 days)     Date/Time Percent Meals Eaten (%)    10/17/20 1239  0 %    Percent Meals Eaten (%): pt will not open mo Accumulation: RN documentation reviewed- none  - Skin Integrity: RN documentation reviewed- no documentation of skin breakdown however pt is at risk with Luis Fernando scale 13    MONITOR AND EVALUATE/NUTRITION GOALS:  - Food and Nutrient Intake:      Monitor: ad

## 2020-10-19 NOTE — PROGRESS NOTES
End of shift summary:  Patient resting comfortably in bed. Bath today. Poor appetite. Possible d/c tomorrow.

## 2020-10-20 VITALS
HEART RATE: 100 BPM | DIASTOLIC BLOOD PRESSURE: 63 MMHG | BODY MASS INDEX: 22.11 KG/M2 | WEIGHT: 129.5 LBS | TEMPERATURE: 98 F | SYSTOLIC BLOOD PRESSURE: 94 MMHG | OXYGEN SATURATION: 94 % | HEIGHT: 64 IN | RESPIRATION RATE: 16 BRPM

## 2020-10-20 NOTE — CM/SW NOTE
10/20/20 0932   Discharge disposition   Expected discharge disposition [facility]   Name of Facillity/Home Care/Hospice [place]   Discharge transportation [facility]  (2pm per Angela)   Jo-Ann @ [facility] approved 2pm d/c.  PHIL SCOTT for FERNANDOA

## 2020-10-20 NOTE — PROGRESS NOTES
[facility]  Progress Note    Haydee Kwong Patient Status:  Inpatient    1950 MRN [id number]   Location [address] [facility] Attending Jorden Turner MD   [facility] Day # 15 PCP Val Mcpherson MD     Date:  10/20//2020  Date of Admissi mouth one time a day related to other psychotic disorder. Not due to a substance or known physiological condition    •  [DISCONTINUED] AmLODIPine Besylate 5 MG Oral Tab, Take 5 mg by mouth daily.     •  [DISCONTINUED] SERTRALINE HCL 50 MG Oral Tab, TAKE 1 T and largely nonverbal. Appears restless and requiring restraints to avert dislodgement of IV's. Moving all extremities. 10/10: sensorium has cleared and agitation resolved. Still quite impulsive and tugging at IV's. Moderate dysphagia per SLP evaluation. replacement when able. IV levothyroxine ordered since hasn't taken oral meds for > 1 week. Hyperlipidemia  Resume statin once oral intake resumed. Low serum vitamin B12  B 12 level therapeutic.       Dementia with behavioral disturbance, unspecifi

## 2020-10-20 NOTE — PLAN OF CARE
Problem: Patient Centered Care  Goal: Patient preferences are identified and integrated in the patient's plan of care  Description: Interventions:  - What would you like us to know as we care for you? Pt is from [facility].    - Provide timely, complete, a ulcer development  - Assess and document skin integrity  - Monitor for areas of redness and/or skin breakdown  - Initiate interventions, skin care algorithm/standards of care as needed  Outcome: Progressing     Problem: SAFETY ADULT - FALL  Goal: Free from given Q 2 hourly,encouraged with oral intake but pt has poor intake. frequent round has done. bed alarms placed on.

## 2020-10-20 NOTE — PLAN OF CARE
Problem: SKIN/TISSUE INTEGRITY - ADULT  Goal: Skin integrity remains intact  Description: INTERVENTIONS  - Assess and document risk factors for pressure ulcer development  - Assess and document skin integrity  - Monitor for areas of redness and/or skin b

## 2020-10-20 NOTE — PROGRESS NOTES
Addended by: Deshawn Rob on: 11/8/2019 02:38 PM     Modules accepted: Ezra Discharge Summary: Gave report to EMS and answered all questions. Paperwork given to EMS. IV taken out. Tele discontinued and hub notified. Patient has belongings. Ether Anger aware patient is being discharged.

## 2020-10-20 NOTE — PROGRESS NOTES
Called [facility] and gave report to RN and answered all questions. Waiting for ambulance to pick patient up around 1500.

## 2020-10-21 NOTE — PAYOR COMM NOTE
--------------  DISCHARGE REVIEW    Payor: [address] #:  [id number]  Authorization Number: [id number]    Admit date: 10/6/20  Admit time:  2143  Discharge Date: 10/20/2020  3:36 PM     Admitting Physician: David Zuñiga MD  Attendi

## 2020-11-27 NOTE — DISCHARGE SUMMARY
[facility]    PATIENT'S NAME: Galo Petersen   ATTENDING PHYSICIAN: Manda Olmos.  Verona Heath MD   PATIENT ACCOUNT#:   [id number]    LOCATION:  [address] [place] [address] #:   [id number]       YOB: 1950  ADMISSION DATE:       10/06/20 also slightly elevated at 1.44. CT of the brain showed cerebral cortical atrophy and chronic small vessel disease. HOSPITAL COURSE:  The patient was hydrated with hypotonic IV fluids and her sodium gradually normalized.   Her delirium cleared some

## 2021-01-01 NOTE — PROGRESS NOTES
[facility]    Lady Garcia is a 75year old female. HPI:     Patient is being seen in evaluation for dementia. She was previously followed by Dr. Viridiana Arroyo, last seen by him in March 2017.   Prior neurology records and familia daily. Disp: 6 tablet Rfl: 0   MEMANTINE HCL 10 MG Oral Tab TAKE 1 TABLET BY MOUTH TWICE DAILY Disp: 180 tablet Rfl: 0   simvastatin 20 MG Oral Tab TAKE 1 TABLET BY MOUTH NIGHTLY Disp: 90 tablet Rfl: 1   Calcium 500-125 MG-UNIT Oral Tab Take by mouth.  Disp Additional considerations would include     demyelination, or immune mediated processes. ASSESSMENT/PLAN:   Assessment   1.  Late onset Alzheimer's disease with behavioral disturbance  Suspect recent decline is a function of her underlying neuro degene 22.83

## 2023-01-18 NOTE — PROGRESS NOTES
Restraints and dobhoff removed. Patient resting comfortably in bed. Xeljanz Counseling: I discussed with the patient the risks of Xeljanz therapy including increased risk of infection, liver issues, headache, diarrhea, or cold symptoms. Live vaccines should be avoided. They were instructed to call if they have any problems.

## 2023-02-22 NOTE — PHYSICAL THERAPY NOTE
PHYSICAL THERAPY EVALUATION - INPATIENT     Room Number: 567/567-A  Evaluation Date: 5/6/2018  Type of Evaluation: Initial  Physician Order: PT Eval and Treat    Presenting Problem: dementia, AMS  Reason for Therapy: Mobility Dysfunction and Discharg adjusting bedclothes, sheets and blankets)?: None   -   Sitting down on and standing up from a chair with arms (e.g., wheelchair, bedside commode, etc.): None   -   Moving from lying on back to sitting on the side of the bed?: None   How much help from ano benefit from skilled inpatient PT to address the above deficits to assist patient in returning to prior to level of function. DISCHARGE RECOMMENDATIONS  PT Discharge Recommendations: Skilled nursing facility (memory care unit);   Pt unable to care for hers [facility]ulnz DISPLAY PLAN FREE TEXT

## 2023-05-10 NOTE — PLAN OF CARE
Pt is alert, but confused with garbled speech. Unable to follow commands. Tube feeding cont'd via NG tube. Pt requires frequent repositioning. Aspiration precautions in place. Pt refused everything by mouth. Wrist restraints in place.  Bed locked in lowest interventions  Outcome: Progressing  Goal: Patient/Family Short Term Goal  Description: Patient's Short Term Goal: lucho, pt is confused    Interventions:   - Monitor vital signs  - Monitor labs  - Monitor airway  - Monitor for pain  - Fall precautions  - As Initiate appropriate interventions as ordered  Outcome: Progressing     Problem: Impaired Functional Mobility  Goal: Achieve highest/safest level of mobility/gait  Description: Interventions:  - Assess patient's functional ability and stability  - Promote services based on physician/LIP order or complex needs related to functional status, cognitive ability or social support system  Outcome: Progressing DISPLAY PLAN FREE TEXT

## 2023-06-12 ENCOUNTER — HOSPITAL ENCOUNTER (EMERGENCY)
Facility: HOSPITAL | Age: 73
Discharge: HOME | End: 2023-06-12
Attending: EMERGENCY MEDICINE
Payer: MEDICARE

## 2023-06-12 ENCOUNTER — APPOINTMENT (OUTPATIENT)
Dept: CT IMAGING | Facility: HOSPITAL | Age: 73
End: 2023-06-12
Attending: EMERGENCY MEDICINE
Payer: MEDICARE

## 2023-06-12 VITALS
HEART RATE: 75 BPM | WEIGHT: 127.88 LBS | DIASTOLIC BLOOD PRESSURE: 98 MMHG | SYSTOLIC BLOOD PRESSURE: 119 MMHG | BODY MASS INDEX: 22 KG/M2 | TEMPERATURE: 98 F | OXYGEN SATURATION: 96 % | RESPIRATION RATE: 18 BRPM
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
## 2023-06-12 DIAGNOSIS — F03.C0 SEVERE DEMENTIA, UNSPECIFIED DEMENTIA TYPE, UNSPECIFIED WHETHER BEHAVIORAL, PSYCHOTIC, OR MOOD DISTURBANCE OR ANXIETY (HCC): ICD-10-CM

## 2023-06-12 DIAGNOSIS — S00.93XA CONTUSION OF HEAD, UNSPECIFIED PART OF HEAD, INITIAL ENCOUNTER: ICD-10-CM

## 2023-06-12 LAB — GLUCOSE BLDC GLUCOMTR-MCNC: 85 MG/DL (ref 70–99)

## 2023-06-12 PROCEDURE — 82962 GLUCOSE BLOOD TEST: CPT

## 2023-06-12 PROCEDURE — 70450 CT HEAD/BRAIN W/O DYE: CPT | Performed by: EMERGENCY MEDICINE

## 2023-06-12 PROCEDURE — 99284 EMERGENCY DEPT VISIT MOD MDM: CPT

## 2023-06-12 NOTE — ED INITIAL ASSESSMENT (HPI)
Per ems patient experienced unwitnessed fall today, patient is a misael and not a reliable historian, hematoma/abrasion noted to left temple, patient alert, per ems she is acting her normal mentation

## 2023-06-12 NOTE — DISCHARGE INSTRUCTIONS
Return if change in behavior or vomiting  Continue previously prescribed medications  Call patient's physician for further orders

## 2025-08-01 ENCOUNTER — APPOINTMENT (OUTPATIENT)
Dept: CT IMAGING | Facility: HOSPITAL | Age: 75
End: 2025-08-01
Attending: EMERGENCY MEDICINE

## 2025-08-01 ENCOUNTER — HOSPITAL ENCOUNTER (EMERGENCY)
Facility: HOSPITAL | Age: 75
Discharge: HOME | End: 2025-08-02
Attending: EMERGENCY MEDICINE

## 2025-08-01 VITALS
RESPIRATION RATE: 18 BRPM | HEART RATE: 72 BPM | TEMPERATURE: 98 F | WEIGHT: 100 LBS | DIASTOLIC BLOOD PRESSURE: 63 MMHG | SYSTOLIC BLOOD PRESSURE: 109 MMHG | BODY MASS INDEX: 17 KG/M2 | OXYGEN SATURATION: 95 %

## 2025-08-01 DIAGNOSIS — S09.90XA INJURY OF HEAD, INITIAL ENCOUNTER: Primary | ICD-10-CM

## 2025-08-01 PROCEDURE — 99284 EMERGENCY DEPT VISIT MOD MDM: CPT

## 2025-08-01 PROCEDURE — 70450 CT HEAD/BRAIN W/O DYE: CPT | Performed by: EMERGENCY MEDICINE

## (undated) NOTE — ED AVS SNAPSHOT
[facility] Emergency Department    [address]     [address] [place] [address]    Phone:  [phone]    Fax:  [phone]           Sonia Huerta   MRN: [id number]    Department:  [facility] Emergency Department   Date of Visit:  6/18 It is our goal to assure that you are completely satisfied with every aspect of your visit today.   In an effort to constantly improve our service to you, we would appreciate any positive or negative feedback related to the care you received in our emergenc [employer] account. You may have had testing done that requires us to contact you. Please make sure we have your correct phone number on file.       I certified that I have received a copy of the aftercare instructions; that these instructions have been expl [employer] will allow you to access patient instructions from your recent visit,  view other health information, and more. To sign up or find more information, go to [email or web address] and click on the Sign Up Now link in the [employer] box.      Enter

## (undated) NOTE — IP AVS SNAPSHOT
[facility]            (For Outpatient Use Only) Initial Admit Date: 10/6/2020   Inpt/Obs Admit Date: Inpt: 10/6/20 / Obs: N/A   Discharge Date:    Huel Curling:  [de-identified]   MRN: [de-identified]   CSN: [id number]   CEID: [id number]        [facility] Subscriber ID:  Pt Rel to Subscriber:    Hospital Account Financial Class: Medicare Advantage    October 20, 2020

## (undated) NOTE — IP AVS SNAPSHOT
[facility]            (For Outpatient Use Only) Initial Admit Date: 5/4/2018   Inpt/Obs Admit Date: Inpt: 5/4/18 / Obs: N/A   Discharge Date:    Deirdre Bunn:  [de-identified]   MRN: [de-identified]   CSN: [id number]        ENCOUNTER  Patient Class: Hospital Account Financial Class: Medicare Advantage    May 23, 2018

## (undated) NOTE — LETTER
January 9, 2018         Gerald Dorado MD  [address] [facility] [address]      Patient: Champ Davenport   YOB: 1950   Date of Visit: 1/9/2018       Dear Dr. Milla Alves MD,    I saw your patient, Champ Davenport, on 1/9/2018.  Jake Anaya

## (undated) NOTE — MR AVS SNAPSHOT
[address]  [phone]  [phone]               Thank you for choosing us for your health care visit with Toya Ospina PT.   We are glad to serve you and happy to provide you with

## (undated) NOTE — MR AVS SNAPSHOT
[address]  [employer] [facility]  [phone]               Thank you for choosing us for your health care visit with Luciana Petit PT.   We are glad to serve you and happy to provide you with

## (undated) NOTE — ED AVS SNAPSHOT
[facility] Emergency Department    [address] [place] [address]    Phone:  [phone]    Fax:  [phone]           Warner Flores   MRN: [id number]    Department:  [facility] Emergency Department   Date of Visit:  6/18 and Class Registration line at [phone] or find a doctor online by visiting [email or web address].    IF THERE IS ANY CHANGE OR WORSENING OF YOUR CONDITION, CALL YOUR PRIMARY CARE PHYSICIAN AT ONCE OR RETURN IMMEDIATELY TO [address] [place] [address].     If

## (undated) NOTE — IP AVS SNAPSHOT
Patient Demographics     Address  [facility] [place] [address] Phone  [phone] [facility])  [phone] [facility]      Emergency Contact(s)     Name Relation Home Work Mobile    Edewecht Friend   [address]   [phone] Commonly known as:  VITAMIN C  Next dose due: Tomorrow 5/24/18      Take 500 mg by mouth daily.                 Where to Get Your Medications      These medications were sent to [address] [place] [address], [place] [address] AT [address] [place] [address] Physician    Filed:  2018  1:17 PM Date of Service:  2018  1:07 PM Status:  Signed    :  Calista Watson MD (Physician)       [facility]    History and Physical[AE.1]    She Arroyo[AE.2] Patient Status:  Inpatient    8 Smoking status: Never Smoker                                                              Smokeless tobacco: Never Used                      Alcohol use: No[AE.2]              Allergies/Medications:    Allergies:[AE.1] No Known Allergies    Prescriptions Pr she is doing  Fine but does say that her father is dead      Cervical Papanicolaou contraindicated    Results:[AE.1]       Lab Results  Component Value Date   WBC 6.2 05/05/2018   HGB 15.0 05/05/2018   HCT 44.1 05/05/2018    05/05/2018   CREATSERUM Author: Chester Kapadia MD Service:  Psychiatry Author Type:  Physician    Filed:  5/5/2018  5:12 PM Date of Service:  5/5/2018  2:29 PM Status:  Signed    :   Chester Kapadia MD (Physician)         [address] [facility] Patient was not able to verbalize that she is in the hospital although she asked about the TV screen in the nurses station and she inform it is TV with patient name on it and inform that the lady is in the nurses station our nurses but she could not proces lamoTRIgine (LAMICTAL) tab 25 mg 25 mg Oral BID   QUEtiapine Fumarate ER (SEROQUEL XR) 24-hr tab 50 mg 50 mg Oral Nightly   Calcium Carbonate-Vitamin D (OSCAL-500) 500-200 MG-UNIT per tab 1 tablet 1 tablet Oral Daily   QUEtiapine Fumarate (SEROQUEL) tab 25 temperature source Oral, resp. rate 18, height 60\", weight 132 lb 6.4 oz, SpO2 97 %, not currently breastfeeding. Mental Status Exam:     The patient is alert and oriented to self but not to place time or condition.   Stated age female in hospital gown Basic Metabolic Panel (8)      CBC With Differential With Platelet      Basic Metabolic Panel (8)    Meds This Visit:    No prescriptions requested or ordered in this encounter        Romaine Mathew MD  5/5/2018[GA.1]        Electronically signed by

## (undated) NOTE — Clinical Note
2/28/2017  To Whom It May Concern:  Medardo Donaldson is currently under my medical care and she has been diagnosed with Advanced Alzheimer's Dementia and is unable to cook.    Medications that the pt takes are:      DONEPEZIL HCL 10 MG Oral Tab  GIVE \"CANDIDA

## (undated) NOTE — Clinical Note
[address] [facility], [address] [place] [address]  [address] [facility] [phone]        Dear Eliezer Kellogg MD,      I had the pleasure of seeing your patient, Vishal Plummer on 3/28/2017.      Below please find a radicular symptoms. MRI of the cervical spine done recently does show some arthritic changes, with some features suggesting rheumatoid arthritis. There was no abnormal cord signal and no significant spinal cord compression.       Current Outpatient Prescr negative  RESPIRATORY: denies shortness of breath, wheezing or cough   CARDIOVASCULAR: denies chest pain or CHIU; no palpitations   GI: denies nausea, vomiting, constipation, diarrhea; no heartburn  GENITAL/: no dysuria, urgency or frequency; no nocturia current arrangement, being helped by her friends. I recommended a follow-up visit every 6 months to monitor her status. They are free to call me if any new symptoms develop. Thank you very much. Return in about 6 months (around 9/28/2017).     Portillo Campos

## (undated) NOTE — MR AVS SNAPSHOT
[address] Boss [address]  [employer]  [phone]               Thank you for choosing us for your health care visit with Arabella Penn PT.   We are glad to serve you and happy to provide you with

## (undated) NOTE — MR AVS SNAPSHOT
[address]  [phone]               Thank you for choosing us for your health care visit with Catalino Hinds PT.   We are glad to serve you and happy to provide you with Please arrive at your scheduled appointment time. Wear comfortable, loose fitting clothing.               MyChart

## (undated) NOTE — MR AVS SNAPSHOT
[facility]  [address] [facility] [address]  [phone]               Thank you for choosing us for your health care visit with Mandeep Walton MD.  We are glad to serve you and happy to provide you with this summary of your vi Evening and weekend appointments for your exam are available. Neel Dave. ([address] [facility])  [address]    [facility], [address] [place] [address] (Courtney Churchill authorization, such as [place], please feel free to schedule your appointment immediately. However, if you are unsure about the requirements for authorization, please wait 5-7 days and then contact your physician's office.  At that time, you will also refer to physiatrist,   Agitation- ?, could be due to advancing dementia follow with neuro  Alzheimer's dementia without behavioral disturbance, unspecified timing of dementia onset-a s abov,e taking the medication  Hypothyroidism, unspecified type- c allow pt to safely navigate environment such as grocery store. 3. Pt will be able to perform reaching activities without pain to allow pt to reach for items off a shelf at or above eye level without limitation.         Results of Recent Testing     URINAL

## (undated) NOTE — MR AVS SNAPSHOT
[address]  [phone]  [phone]               Thank you for choosing us for your health care visit with Aayush Brewster PT.   We are glad to serve you and happy to provide you with Please arrive at your scheduled appointment time. Wear comfortable, loose fitting clothing.             Feb 03, 2017 10:30 AM   [facility] Visit By Therapist with Meir Mccann, [address] [facility] [place]                  Visit [facility] online at  [email or web address]

## (undated) NOTE — MR AVS SNAPSHOT
[address]  [facility] [address]  [phone]  [phone]               Thank you for choosing us for your health care visit with Radha Monk PT.   We are glad to serve you and happy to provide you wit [facility] ([address] [place] [address])    [address] [address]   [phone]           Please arrive at your scheduled appointment time. Wear comfortable, loose fitting clothing.             Jan 25, 201 Commonly known as:  SYNTHROID           Memantine HCl 10 MG Tabs   TAKE 1 TABLET BY MOUTH TWICE DAILY   Commonly known as:  NAMENDA           simvastatin 20 MG Tabs   TAKE 1 TABLET BY MOUTH NIGHTLY   Commonly known as:  ZOCOR           Vitamin B-12 [address] [facility]

## (undated) NOTE — MR AVS SNAPSHOT
[facility]  [address], [address] [facility]  [address] [facility] [phone]               Thank you for choosing us for your health care visit with Klever Chapman MD, MD.  We are glad to serve you and happy to provide you with this summary o What changed:  See the new instructions.    Commonly known as:  ARICEPT           Levothyroxine Sodium 50 MCG Tabs   TAKE 1 TABLET BY MOUTH BEFORE BREAKFAST   Commonly known as:  SYNTHROID           Memantine HCl 10 MG Tabs   Take 1 tablet (10 mg total) by drinks, candies and desserts   Eat plenty of low-fat dairy products High fat meats and dairy   Choose whole grain products Foods high in sodium   Water is best for hydration Fast food.    Eat at home when possible     Tips for increasing your physical activ

## (undated) NOTE — MR AVS SNAPSHOT
[address]  [employer]  [phone]               Thank you for choosing us for your health care visit with Yesy Marroquin PT.   We are glad to serve you and happy to provide you with

## (undated) NOTE — IP AVS SNAPSHOT
Patient Demographics     Address  [address] [place] [address] Phone  [phone] [facility])  [phone] (Mobile) *Preferred*      Emergency Contact(s)     Name Relation Home Work Mobile    Joel Poole   [address]  Procedure Component Value Units Date/Time    Blood Culture FREQ X 2 [[id number]] Collected: 10/06/20 1530    Order Status: Completed Lab Status: Final result Updated: 10/11/20 1701    Specimen: Blood,peripheral      Blood Culture Result No Growth 5 Days • No Known Problems Self    • No Known Problems Sister    • No Known Problems Daughter    • No Known Problems Maternal Grandmother    • No Known Problems Paternal Grandmother    • No Known Problems Maternal Aunt    • No Known Problems Paternal Aunt    • No Vital Signs:  Blood pressure (!) 119/97, pulse 95, temperature 98.1 °F (36.7 °C), temperature source Oral, resp. rate 20, height 5' 4\" (1.626 m), weight 123 lb 1 oz (55.8 kg), SpO2 99 %, not currently breastfeeding. Nursing note and vitals reviewed. CONCLUSION:  1. Cerebral cortical atrophy. Mild chronic white matter microvascular ischemia. 2. No acute intracranial hemorrhage. No acute intracranial CT abnormalities.  3. No significant change    Dictated by (CST): Jonathan Hubbard MD on 10/06/2020 Dementia with behavioral disturbance, unspecified dementia type ([facility] [address]  At baseline pt pleasant, confused and somewhat impulsive but easily redirectable. COVID-19 virus infection  Continue to monitor markers and SpO2.       Dehydration  IV fluids orde History of Present Illness:   Patient is a 69year old  female with history of frontal lobe dementia and anxiety who has been tested positive for COVID 9/23/2020 brought to the hospital for increased deterioration in her function and increased isaias 2. Past psychiatric inpatient: None  3. Past outpatient history: Previous psychiatric treatment with medication management  4. Past suicide history: None  5.  Medication history: Patient on Seroquel and Zoloft    Medical History:       Past Medical History •  Valproate Sodium (DEPACON) 250 mg in sodium chloride 0.9% 50 mL IVPB, 250 mg, Intravenous, Q8H    •  haloperidol lactate (HALDOL) 5 MG/ML injection 0.5 mg, 0.5 mg, Intravenous, TID    •  0.9% NaCl infusion, , Intravenous, Continuous    •  haloperidol la DDIMER 1.44 (H) 10/07/2020    CRP 2.29 (H) 10/07/2020    TROP <0.045 10/06/2020     10/06/2020    B12 >1,500 (H) 04/02/2018         Imaging:  Ct Brain Or Head ([id number])    Result Date: 10/6/2020  CONCLUSION:  1. Cerebral cortical atrophy.   Mild chroni The patient was sleepy and during our communication dosing up with limited verbal communication. Otherwise the patient has been demonstrating episodes of agitation and anger. The patient demonstrating disorientation to place time and condition.   The cindy MD BLOOD SMEAR CONSULT      Basic Metabolic Panel (8)      CBC With Differential With Platelet      C-Reactive Protein      D-Dimer      Ferritin      LDH      TSH W Reflex To Free T4      Vitamin B12      Blood Culture FREQ X 2      Emergency MRSA Scr Pt[MD.1] fairly latha[MD.2]rt, afebrile and on room air[MD.1] with N-G tube present as well as wrist restraints. [MD.2] Pt seen for swallow analysis per BSE recommendations (after consulting with RN).  Pt seen upright in with current diet of[MD.1] pureed/NTL[M Compensatory Strategies Recommended: No straws; Liquids via spoon; Alternate consistencies  Aspiration Precautions: Upright position; Slow rate; No straw  Medication Administration Recommendations: Crushed in puree    Patient Experiencing Pain: No[MD.3] Jana Bass M.S. CCC/SLP  Speech-Language Pathologist  [facility]  #[id number][MD.1]        Electronically signed by GERSON Soriano on 10/15/2020  4:12 PM   Attribution Key    MD.1 - GERSON Soriano on 10/15/2020  3:4